# Patient Record
Sex: FEMALE | Race: OTHER | Employment: UNEMPLOYED | URBAN - METROPOLITAN AREA
[De-identification: names, ages, dates, MRNs, and addresses within clinical notes are randomized per-mention and may not be internally consistent; named-entity substitution may affect disease eponyms.]

---
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
## 2020-04-11 ENCOUNTER — APPOINTMENT (OUTPATIENT)
Dept: CT IMAGING | Age: 20
End: 2020-04-11
Payer: COMMERCIAL

## 2020-04-11 ENCOUNTER — HOSPITAL ENCOUNTER (EMERGENCY)
Age: 20
Discharge: HOME | End: 2020-04-11
Payer: COMMERCIAL

## 2020-04-11 VITALS
RESPIRATION RATE: 18 BRPM | DIASTOLIC BLOOD PRESSURE: 67 MMHG | HEART RATE: 89 BPM | TEMPERATURE: 98 F | OXYGEN SATURATION: 100 % | SYSTOLIC BLOOD PRESSURE: 123 MMHG | HEIGHT: 64 IN | WEIGHT: 210 LBS | BODY MASS INDEX: 35.85 KG/M2

## 2020-04-11 LAB
ALBUMIN SERPL-MCNC: 4.6 GM/DL (ref 3.4–5)
ALP BLD-CCNC: 43 IU/L (ref 40–129)
ALT SERPL-CCNC: 12 U/L (ref 10–40)
ANION GAP SERPL CALCULATED.3IONS-SCNC: 15 MMOL/L (ref 4–16)
AST SERPL-CCNC: 16 IU/L (ref 15–37)
BASOPHILS ABSOLUTE: 0 K/CU MM
BASOPHILS RELATIVE PERCENT: 0.2 % (ref 0–1)
BILIRUB SERPL-MCNC: 0.4 MG/DL (ref 0–1)
BUN BLDV-MCNC: 9 MG/DL (ref 6–23)
CALCIUM SERPL-MCNC: 9.5 MG/DL (ref 8.3–10.6)
CHLORIDE BLD-SCNC: 106 MMOL/L (ref 99–110)
CO2: 16 MMOL/L (ref 21–32)
CREAT SERPL-MCNC: 0.7 MG/DL (ref 0.6–1.1)
DIFFERENTIAL TYPE: ABNORMAL
EOSINOPHILS ABSOLUTE: 0 K/CU MM
EOSINOPHILS RELATIVE PERCENT: 0.1 % (ref 0–3)
GFR AFRICAN AMERICAN: >60 ML/MIN/1.73M2
GFR NON-AFRICAN AMERICAN: >60 ML/MIN/1.73M2
GLUCOSE BLD-MCNC: 126 MG/DL (ref 70–99)
HCG QUALITATIVE: NEGATIVE
HCT VFR BLD CALC: 46.1 % (ref 37–47)
HEMOGLOBIN: 15.1 GM/DL (ref 12.5–16)
IMMATURE NEUTROPHIL %: 0.3 % (ref 0–0.43)
LIPASE: 17 IU/L (ref 13–60)
LYMPHOCYTES ABSOLUTE: 1.1 K/CU MM
LYMPHOCYTES RELATIVE PERCENT: 7.9 % (ref 24–44)
MCH RBC QN AUTO: 29.5 PG (ref 27–31)
MCHC RBC AUTO-ENTMCNC: 32.8 % (ref 32–36)
MCV RBC AUTO: 90.2 FL (ref 78–100)
MONOCYTES ABSOLUTE: 0.5 K/CU MM
MONOCYTES RELATIVE PERCENT: 3.9 % (ref 0–4)
NUCLEATED RBC %: 0 %
PDW BLD-RTO: 12.7 % (ref 11.7–14.9)
PLATELET # BLD: 292 K/CU MM (ref 140–440)
PMV BLD AUTO: 10.1 FL (ref 7.5–11.1)
POTASSIUM SERPL-SCNC: 3.8 MMOL/L (ref 3.5–5.1)
RBC # BLD: 5.11 M/CU MM (ref 4.2–5.4)
SEGMENTED NEUTROPHILS ABSOLUTE COUNT: 11.6 K/CU MM
SEGMENTED NEUTROPHILS RELATIVE PERCENT: 87.6 % (ref 36–66)
SODIUM BLD-SCNC: 137 MMOL/L (ref 135–145)
TOTAL IMMATURE NEUTOROPHIL: 0.04 K/CU MM
TOTAL NUCLEATED RBC: 0 K/CU MM
TOTAL PROTEIN: 7.7 GM/DL (ref 6.4–8.2)
WBC # BLD: 13.3 K/CU MM (ref 4–10.5)

## 2020-04-11 PROCEDURE — 96372 THER/PROPH/DIAG INJ SC/IM: CPT

## 2020-04-11 PROCEDURE — 84703 CHORIONIC GONADOTROPIN ASSAY: CPT

## 2020-04-11 PROCEDURE — 80053 COMPREHEN METABOLIC PANEL: CPT

## 2020-04-11 PROCEDURE — 83690 ASSAY OF LIPASE: CPT

## 2020-04-11 PROCEDURE — 96376 TX/PRO/DX INJ SAME DRUG ADON: CPT

## 2020-04-11 PROCEDURE — 96375 TX/PRO/DX INJ NEW DRUG ADDON: CPT

## 2020-04-11 PROCEDURE — 74177 CT ABD & PELVIS W/CONTRAST: CPT

## 2020-04-11 PROCEDURE — 93005 ELECTROCARDIOGRAM TRACING: CPT | Performed by: PHYSICIAN ASSISTANT

## 2020-04-11 PROCEDURE — 6360000002 HC RX W HCPCS: Performed by: PHYSICIAN ASSISTANT

## 2020-04-11 PROCEDURE — 85025 COMPLETE CBC W/AUTO DIFF WBC: CPT

## 2020-04-11 PROCEDURE — 96374 THER/PROPH/DIAG INJ IV PUSH: CPT

## 2020-04-11 PROCEDURE — 96361 HYDRATE IV INFUSION ADD-ON: CPT

## 2020-04-11 PROCEDURE — 99284 EMERGENCY DEPT VISIT MOD MDM: CPT

## 2020-04-11 PROCEDURE — 2580000003 HC RX 258: Performed by: PHYSICIAN ASSISTANT

## 2020-04-11 PROCEDURE — 6360000004 HC RX CONTRAST MEDICATION: Performed by: PHYSICIAN ASSISTANT

## 2020-04-11 RX ORDER — HALOPERIDOL 5 MG/ML
1 INJECTION INTRAMUSCULAR ONCE
Status: DISCONTINUED | OUTPATIENT
Start: 2020-04-11 | End: 2020-04-11

## 2020-04-11 RX ORDER — HALOPERIDOL 5 MG/ML
5 INJECTION INTRAMUSCULAR ONCE
Status: DISCONTINUED | OUTPATIENT
Start: 2020-04-11 | End: 2020-04-11

## 2020-04-11 RX ORDER — DICYCLOMINE HYDROCHLORIDE 10 MG/1
20 CAPSULE ORAL
Qty: 30 CAPSULE | Refills: 0 | Status: SHIPPED | OUTPATIENT
Start: 2020-04-11 | End: 2020-05-18

## 2020-04-11 RX ORDER — PROMETHAZINE HYDROCHLORIDE 25 MG/ML
25 INJECTION, SOLUTION INTRAMUSCULAR; INTRAVENOUS ONCE
Status: COMPLETED | OUTPATIENT
Start: 2020-04-11 | End: 2020-04-11

## 2020-04-11 RX ORDER — ONDANSETRON 2 MG/ML
4 INJECTION INTRAMUSCULAR; INTRAVENOUS EVERY 30 MIN PRN
Status: DISCONTINUED | OUTPATIENT
Start: 2020-04-11 | End: 2020-04-11 | Stop reason: HOSPADM

## 2020-04-11 RX ORDER — ONDANSETRON 4 MG/1
4 TABLET, ORALLY DISINTEGRATING ORAL EVERY 6 HOURS
Qty: 10 TABLET | Refills: 0 | Status: SHIPPED | OUTPATIENT
Start: 2020-04-11 | End: 2020-05-18

## 2020-04-11 RX ORDER — 0.9 % SODIUM CHLORIDE 0.9 %
1000 INTRAVENOUS SOLUTION INTRAVENOUS ONCE
Status: COMPLETED | OUTPATIENT
Start: 2020-04-11 | End: 2020-04-11

## 2020-04-11 RX ORDER — PROCHLORPERAZINE EDISYLATE 5 MG/ML
10 INJECTION INTRAMUSCULAR; INTRAVENOUS ONCE
Status: COMPLETED | OUTPATIENT
Start: 2020-04-11 | End: 2020-04-11

## 2020-04-11 RX ORDER — PROMETHAZINE HYDROCHLORIDE 25 MG/1
25 TABLET ORAL EVERY 6 HOURS PRN
Qty: 12 TABLET | Refills: 0 | Status: SHIPPED | OUTPATIENT
Start: 2020-04-11 | End: 2020-04-16

## 2020-04-11 RX ADMIN — ONDANSETRON 4 MG: 2 INJECTION INTRAMUSCULAR; INTRAVENOUS at 17:25

## 2020-04-11 RX ADMIN — PROCHLORPERAZINE EDISYLATE 10 MG: 5 INJECTION INTRAMUSCULAR; INTRAVENOUS at 18:44

## 2020-04-11 RX ADMIN — ONDANSETRON 4 MG: 2 INJECTION INTRAMUSCULAR; INTRAVENOUS at 18:39

## 2020-04-11 RX ADMIN — PROMETHAZINE HYDROCHLORIDE 25 MG: 25 INJECTION INTRAMUSCULAR; INTRAVENOUS at 16:36

## 2020-04-11 RX ADMIN — SODIUM CHLORIDE 1000 ML: 9 INJECTION, SOLUTION INTRAVENOUS at 18:39

## 2020-04-11 RX ADMIN — IOPAMIDOL 75 ML: 755 INJECTION, SOLUTION INTRAVENOUS at 19:26

## 2020-04-11 ASSESSMENT — PAIN SCALES - GENERAL
PAINLEVEL_OUTOF10: 8
PAINLEVEL_OUTOF10: 0

## 2020-04-11 ASSESSMENT — PAIN DESCRIPTION - LOCATION: LOCATION: ABDOMEN

## 2020-04-11 ASSESSMENT — PAIN DESCRIPTION - PAIN TYPE: TYPE: ACUTE PAIN

## 2020-04-11 NOTE — ED PROVIDER NOTES
9/21/19   Cyndee Zhang PA-C   ondansetron (ZOFRAN) 4 MG tablet Take 1 tablet by mouth every 8 hours as needed for Nausea 9/21/19   Cyndee Zhang PA-C   metoclopramide (REGLAN) 10 MG tablet Take 1 tablet by mouth 4 times daily for 7 days 9/1/18 9/8/18  ZAID Roberson CNP   ranitidine (ZANTAC) 300 MG tablet Take 1 tablet by mouth nightly 9/1/18   ZAID Roberson CNP   ondansetron (ZOFRAN ODT) 4 MG disintegrating tablet Take 1 tablet by mouth every 6 hours 4/6/18   Harman Dietz PA-C   hydrOXYzine (VISTARIL) 25 MG capsule Take 1 capsule by mouth three times daily 5/10/17   Kristi Goel PA-C   acetaminophen (APAP EXTRA STRENGTH) 500 MG tablet Take 1 tablet by mouth every 6 hours as needed for Pain 5/8/17   Kristi Goel PA-C   traZODone (DESYREL) 50 MG tablet Take 50 mg by mouth nightly    Historical Provider, MD   lurasidone (LATUDA) 20 MG TABS tablet Take 20 mg by mouth daily    Historical Provider, MD   divalproex (DEPAKOTE) 500 MG DR tablet Take 500 mg by mouth nightly. Historical Provider, MD   ARIPiprazole (ABILIFY) 5 MG tablet Take 10 mg by mouth daily. Historical Provider, MD   ibuprofen (IBU) 400 MG tablet Take 1 tablet by mouth every 6 hours as needed for Pain. 2/25/12 2/24/13  Manoj Reynoso MD       Social history:  reports that she has been smoking cigarettes. She has been smoking about 0.50 packs per day. She has never used smokeless tobacco. She reports current alcohol use. She reports current drug use. Drug: Marijuana. Family history:    Family History   Problem Relation Age of Onset    Asthma Mother          Exam  BP (!) 134/90   Pulse 70   Temp 97.7 °F (36.5 °C)   Resp 18   Ht 5' 4\" (1.626 m)   Wt 210 lb (95.3 kg)   SpO2 100%   BMI 36.05 kg/m²   Nursing note and vitals reviewed. Constitutional: Well developed, well nourished. Repeatedly actively retching, minimal vomitus produced    HENT:      Head: Normocephalic and atraumatic. Ears: External ears normal.      Nose: Nose normal.     Mouth: Membrane mucosa moist and pink. No posterior oropharynx erythema or tonsillar edema  Eyes: Anicteric sclera. No discharge, PERRL  Neck: Supple. Trachea midline. Cardiovascular: RRR, no murmurs, rubs, or gallops, radial pulses 2+ bilaterally. Pulmonary/Chest: Effort normal. No respiratory distress. CTAB. No stridor. No wheezes. No rales. Abdominal: Soft. TTP in epigastric region. No distension. No guarding, rebound tenderness, or evidence of ascites. : No CVA tenderness. Musculoskeletal: Moves all extremities. No gross deformity. Neurological: Alert and oriented to person, place, and time. Normal muscle tone. Skin: Warm and dry. No rash. Psychiatric: Normal mood and affect. Behavior is normal.      EKG   Please see Dr. Mark Arrieta note for EKG read.       Radiographs (if obtained):  [] The following radiograph was interpreted by myself in the absence of a radiologist:   [x] Radiologist's Report Reviewed:  CT ABDOMEN PELVIS W IV CONTRAST    (Results Pending)          Labs  Results for orders placed or performed during the hospital encounter of 04/11/20   CBC Auto Differential   Result Value Ref Range    WBC 13.3 (H) 4.0 - 10.5 K/CU MM    RBC 5.11 4.2 - 5.4 M/CU MM    Hemoglobin 15.1 12.5 - 16.0 GM/DL    Hematocrit 46.1 37 - 47 %    MCV 90.2 78 - 100 FL    MCH 29.5 27 - 31 PG    MCHC 32.8 32.0 - 36.0 %    RDW 12.7 11.7 - 14.9 %    Platelets 632 940 - 747 K/CU MM    MPV 10.1 7.5 - 11.1 FL    Differential Type AUTOMATED DIFFERENTIAL     Segs Relative 87.6 (H) 36 - 66 %    Lymphocytes % 7.9 (L) 24 - 44 %    Monocytes % 3.9 0 - 4 %    Eosinophils % 0.1 0 - 3 %    Basophils % 0.2 0 - 1 %    Segs Absolute 11.6 K/CU MM    Lymphocytes Absolute 1.1 K/CU MM    Monocytes Absolute 0.5 K/CU MM    Eosinophils Absolute 0.0 K/CU MM    Basophils Absolute 0.0 K/CU MM    Nucleated RBC % 0.0 %    Total Nucleated RBC 0.0 K/CU MM    Total Immature Neutrophil

## 2020-04-11 NOTE — ED TRIAGE NOTES
Pt states she woke up at noon with diarrhea and vomiting. Pt tried to take immodium and pepto bismal with out improvement. Pt also complains of epigastric/upper abd pain.

## 2020-04-12 PROCEDURE — 93010 ELECTROCARDIOGRAM REPORT: CPT | Performed by: INTERNAL MEDICINE

## 2020-04-13 ENCOUNTER — HOSPITAL ENCOUNTER (EMERGENCY)
Age: 20
Discharge: HOME | End: 2020-04-13
Attending: EMERGENCY MEDICINE
Payer: COMMERCIAL

## 2020-04-13 ENCOUNTER — CARE COORDINATION (OUTPATIENT)
Dept: CARE COORDINATION | Age: 20
End: 2020-04-13

## 2020-04-13 VITALS
WEIGHT: 210 LBS | BODY MASS INDEX: 35.85 KG/M2 | TEMPERATURE: 98 F | HEIGHT: 64 IN | DIASTOLIC BLOOD PRESSURE: 87 MMHG | HEART RATE: 80 BPM | OXYGEN SATURATION: 100 % | RESPIRATION RATE: 17 BRPM | SYSTOLIC BLOOD PRESSURE: 122 MMHG

## 2020-04-13 LAB
ALBUMIN SERPL-MCNC: 4.5 GM/DL (ref 3.4–5)
ALP BLD-CCNC: 41 IU/L (ref 40–129)
ALT SERPL-CCNC: 15 U/L (ref 10–40)
ANION GAP SERPL CALCULATED.3IONS-SCNC: 18 MMOL/L (ref 4–16)
AST SERPL-CCNC: 16 IU/L (ref 15–37)
BASOPHILS ABSOLUTE: 0.1 K/CU MM
BASOPHILS RELATIVE PERCENT: 0.3 % (ref 0–1)
BILIRUB SERPL-MCNC: 0.7 MG/DL (ref 0–1)
BUN BLDV-MCNC: 13 MG/DL (ref 6–23)
CALCIUM SERPL-MCNC: 9.2 MG/DL (ref 8.3–10.6)
CHLORIDE BLD-SCNC: 101 MMOL/L (ref 99–110)
CO2: 18 MMOL/L (ref 21–32)
CREAT SERPL-MCNC: 0.7 MG/DL (ref 0.6–1.1)
DIFFERENTIAL TYPE: ABNORMAL
EOSINOPHILS ABSOLUTE: 0.1 K/CU MM
EOSINOPHILS RELATIVE PERCENT: 0.4 % (ref 0–3)
GFR AFRICAN AMERICAN: >60 ML/MIN/1.73M2
GFR NON-AFRICAN AMERICAN: >60 ML/MIN/1.73M2
GLUCOSE BLD-MCNC: 111 MG/DL (ref 70–99)
HCG QUALITATIVE: NEGATIVE
HCT VFR BLD CALC: 44.8 % (ref 37–47)
HEMOGLOBIN: 15.6 GM/DL (ref 12.5–16)
IMMATURE NEUTROPHIL %: 0.4 % (ref 0–0.43)
LIPASE: 20 IU/L (ref 13–60)
LYMPHOCYTES ABSOLUTE: 2.1 K/CU MM
LYMPHOCYTES RELATIVE PERCENT: 11.6 % (ref 24–44)
MCH RBC QN AUTO: 29.7 PG (ref 27–31)
MCHC RBC AUTO-ENTMCNC: 34.8 % (ref 32–36)
MCV RBC AUTO: 85.2 FL (ref 78–100)
MONOCYTES ABSOLUTE: 1.1 K/CU MM
MONOCYTES RELATIVE PERCENT: 6 % (ref 0–4)
NUCLEATED RBC %: 0 %
PDW BLD-RTO: 12.6 % (ref 11.7–14.9)
PLATELET # BLD: 378 K/CU MM (ref 140–440)
PMV BLD AUTO: 9.9 FL (ref 7.5–11.1)
POTASSIUM SERPL-SCNC: 3.3 MMOL/L (ref 3.5–5.1)
RBC # BLD: 5.26 M/CU MM (ref 4.2–5.4)
SEGMENTED NEUTROPHILS ABSOLUTE COUNT: 14.7 K/CU MM
SEGMENTED NEUTROPHILS RELATIVE PERCENT: 81.3 % (ref 36–66)
SODIUM BLD-SCNC: 137 MMOL/L (ref 135–145)
TOTAL IMMATURE NEUTOROPHIL: 0.07 K/CU MM
TOTAL NUCLEATED RBC: 0 K/CU MM
TOTAL PROTEIN: 7.7 GM/DL (ref 6.4–8.2)
WBC # BLD: 18 K/CU MM (ref 4–10.5)

## 2020-04-13 PROCEDURE — 83690 ASSAY OF LIPASE: CPT

## 2020-04-13 PROCEDURE — 99284 EMERGENCY DEPT VISIT MOD MDM: CPT

## 2020-04-13 PROCEDURE — 6360000002 HC RX W HCPCS: Performed by: EMERGENCY MEDICINE

## 2020-04-13 PROCEDURE — 36415 COLL VENOUS BLD VENIPUNCTURE: CPT

## 2020-04-13 PROCEDURE — 80053 COMPREHEN METABOLIC PANEL: CPT

## 2020-04-13 PROCEDURE — 96375 TX/PRO/DX INJ NEW DRUG ADDON: CPT

## 2020-04-13 PROCEDURE — 84703 CHORIONIC GONADOTROPIN ASSAY: CPT

## 2020-04-13 PROCEDURE — 96374 THER/PROPH/DIAG INJ IV PUSH: CPT

## 2020-04-13 PROCEDURE — 2580000003 HC RX 258: Performed by: EMERGENCY MEDICINE

## 2020-04-13 PROCEDURE — 85025 COMPLETE CBC W/AUTO DIFF WBC: CPT

## 2020-04-13 RX ORDER — HALOPERIDOL 5 MG/ML
4 INJECTION INTRAMUSCULAR ONCE
Status: COMPLETED | OUTPATIENT
Start: 2020-04-13 | End: 2020-04-13

## 2020-04-13 RX ORDER — ONDANSETRON 2 MG/ML
4 INJECTION INTRAMUSCULAR; INTRAVENOUS ONCE
Status: COMPLETED | OUTPATIENT
Start: 2020-04-13 | End: 2020-04-13

## 2020-04-13 RX ORDER — DIPHENHYDRAMINE HYDROCHLORIDE 50 MG/ML
25 INJECTION INTRAMUSCULAR; INTRAVENOUS ONCE
Status: COMPLETED | OUTPATIENT
Start: 2020-04-13 | End: 2020-04-13

## 2020-04-13 RX ORDER — 0.9 % SODIUM CHLORIDE 0.9 %
1000 INTRAVENOUS SOLUTION INTRAVENOUS ONCE
Status: COMPLETED | OUTPATIENT
Start: 2020-04-13 | End: 2020-04-13

## 2020-04-13 RX ADMIN — ONDANSETRON 4 MG: 2 INJECTION INTRAMUSCULAR; INTRAVENOUS at 22:02

## 2020-04-13 RX ADMIN — DIPHENHYDRAMINE HYDROCHLORIDE 25 MG: 50 INJECTION INTRAMUSCULAR; INTRAVENOUS at 21:04

## 2020-04-13 RX ADMIN — HALOPERIDOL LACTATE 4 MG: 5 INJECTION, SOLUTION INTRAMUSCULAR at 21:05

## 2020-04-13 RX ADMIN — SODIUM CHLORIDE 1000 ML: 9 INJECTION, SOLUTION INTRAVENOUS at 20:28

## 2020-04-13 ASSESSMENT — PAIN SCALES - GENERAL
PAINLEVEL_OUTOF10: 0
PAINLEVEL_OUTOF10: 9

## 2020-04-13 ASSESSMENT — PAIN DESCRIPTION - PAIN TYPE: TYPE: ACUTE PAIN

## 2020-04-13 ASSESSMENT — PAIN DESCRIPTION - LOCATION: LOCATION: ABDOMEN

## 2020-04-13 NOTE — CARE COORDINATION
Patient contacted regarding recent discharge and COVID-19 risk   Care Transition Nurse/ Ambulatory Care Manager contacted the patient by telephone to perform post discharge assessment. Line busy x 2. User busy.

## 2020-04-14 ENCOUNTER — CARE COORDINATION (OUTPATIENT)
Dept: CARE COORDINATION | Age: 20
End: 2020-04-14

## 2020-04-14 NOTE — ED PROVIDER NOTES
[address] [facility]  EMERGENCYDEPARTMENT ENCOUNTER      Pt Name: Jillian Lal  MRN: [id number]  Armstrongfsameer 2000of evaluation: 4/13/2020  Provider:Alvarado Cruz MD    [address] [place] [address]       Chief Complaint   Patient presents with    Abdominal Pain     mid-upper; states that it started 2 days ago and has progressed. Pt states that she was seen 2 days ago for same symptoms    Nausea    Emesis       HISTORY OF PRESENT ILLNESS    Jillian Lal is a 21 y.o. female who presents to the emergency department with abdominal pain. Started 2 days ago, just after smoking marijuana. Patient has history of cannabis hyperemesis syndrome. She says the pain is 9 or 10 severity, she cannot stop vomiting. She is taking Zofran but vomited it up. Nursing Notes were reviewed. REVIEW OF SYSTEMS       Review of Systems    10 point review of systems was performed and was negative exceptas specifically noted in the HPI. PAST MEDICAL HISTORY     Past Medical History:   Diagnosis Date    ADHD (attention deficit hyperactivity disorder)     Anxiety     Bipolar 1 disorder ([facility] [address]     Depression          SURGICAL HISTORY     History reviewed. No pertinent surgical history.       CURRENT MEDICATIONS       Discharge Medication List as of 4/13/2020 11:00 PM      CONTINUE these medications which have NOT CHANGED    Details   dicyclomine (BENTYL) 10 MG capsule Take 2 capsules by mouth 4 times daily (before meals and nightly), Disp-30 capsule, R-0Print      ondansetron (ZOFRAN ODT) 4 MG disintegrating tablet Take 1 tablet by mouth every 6 hours, Disp-10 tablet, R-0Print      promethazine (PHENERGAN) 25 MG tablet Take 1 tablet by mouth every 6 hours as needed for Nausea, Disp-12 tablet, R-0Print      famotidine (PEPCID) 20 MG tablet Take 1 tablet by mouth 2 times daily, Disp-30 tablet, R-0Print      ondansetron (ZOFRAN) 4 MG tablet Take 1 tablet by mouth every 8 hours as needed for Nausea, Disp-10 tablet, R-0Print      metoclopramide (REGLAN) 10 MG tablet Take 1 tablet by mouth 4 times daily for 7 days, Disp-20 tablet, R-0Print      ranitidine (ZANTAC) 300 MG tablet Take 1 tablet by mouth nightly, Disp-30 tablet, R-0Print      hydrOXYzine (VISTARIL) 25 MG capsule Take 1 capsule by mouth three times daily, Disp-30 capsule, R-1Print      acetaminophen (APAP EXTRA STRENGTH) 500 MG tablet Take 1 tablet by mouth every 6 hours as needed for Pain, Disp-30 tablet, R-0Print      traZODone (DESYREL) 50 MG tablet Take 50 mg by mouth nightly      lurasidone (LATUDA) 20 MG TABS tablet Take 20 mg by mouth daily      divalproex (DEPAKOTE) 500 MG DR tablet Take 500 mg by mouth nightly. ARIPiprazole (ABILIFY) 5 MG tablet Take 10 mg by mouth daily. ibuprofen (IBU) 400 MG tablet Take 1 tablet by mouth every 6 hours as needed for Pain., Disp-15 tablet, R-0             ALLERGIES     Patient has no known allergies.     FAMILY HISTORY       Family History   Problem Relation Age of Onset    Asthma Mother           SOCIAL HISTORY       Social History     Socioeconomic History    Marital status: Single     Spouse name: None    Number of children: None    Years of education: None    Highest education level: None   Occupational History    None   Social Needs    Financial resource strain: None    Food insecurity     Worry: None     Inability: None    Transportation needs     Medical: None     Non-medical: None   Tobacco Use    Smoking status: Current Every Day Smoker     Packs/day: 0.50     Types: Cigarettes    Smokeless tobacco: Never Used   Substance and Sexual Activity    Alcohol use: Yes     Comment: rarely    Drug use: Yes     Types: Marijuana    Sexual activity: Never   Lifestyle    Physical activity     Days per week: None     Minutes per session: None    Stress: None   Relationships    Social connections     Talks on phone: None     Gets together: None     Attends

## 2020-04-14 NOTE — ED NOTES
PO challenge completed at this time. Patient able to keep fluids down.       Jonathan Torres RN  04/13/20 [id number]

## 2020-04-15 ENCOUNTER — CARE COORDINATION (OUTPATIENT)
Dept: CARE COORDINATION | Age: 20
End: 2020-04-15

## 2020-04-15 NOTE — CARE COORDINATION
Attempted call to pt regarding recent discharge and COVID-19 risk   Care Transition Nurse/ Ambulatory Care Manager contacted the patient by telephone to perform post discharge assessment. Line busy/user busy.

## 2020-04-16 ENCOUNTER — HOSPITAL ENCOUNTER (EMERGENCY)
Age: 20
Discharge: HOME | End: 2020-04-16
Attending: EMERGENCY MEDICINE
Payer: COMMERCIAL

## 2020-04-16 VITALS
WEIGHT: 210 LBS | OXYGEN SATURATION: 100 % | DIASTOLIC BLOOD PRESSURE: 72 MMHG | BODY MASS INDEX: 35.85 KG/M2 | SYSTOLIC BLOOD PRESSURE: 158 MMHG | RESPIRATION RATE: 17 BRPM | HEIGHT: 64 IN | TEMPERATURE: 98.7 F | HEART RATE: 75 BPM

## 2020-04-16 LAB
ALBUMIN SERPL-MCNC: 4.6 GM/DL (ref 3.4–5)
ALP BLD-CCNC: 40 IU/L (ref 40–129)
ALT SERPL-CCNC: 16 U/L (ref 10–40)
ANION GAP SERPL CALCULATED.3IONS-SCNC: 16 MMOL/L (ref 4–16)
AST SERPL-CCNC: 14 IU/L (ref 15–37)
BASOPHILS ABSOLUTE: 0 K/CU MM
BASOPHILS RELATIVE PERCENT: 0.2 % (ref 0–1)
BILIRUB SERPL-MCNC: 1 MG/DL (ref 0–1)
BUN BLDV-MCNC: 9 MG/DL (ref 6–23)
CALCIUM SERPL-MCNC: 9.4 MG/DL (ref 8.3–10.6)
CHLORIDE BLD-SCNC: 99 MMOL/L (ref 99–110)
CO2: 21 MMOL/L (ref 21–32)
CREAT SERPL-MCNC: 0.7 MG/DL (ref 0.6–1.1)
DIFFERENTIAL TYPE: ABNORMAL
EKG ATRIAL RATE: 73 BPM
EKG DIAGNOSIS: NORMAL
EKG P AXIS: 29 DEGREES
EKG P-R INTERVAL: 176 MS
EKG Q-T INTERVAL: 430 MS
EKG QRS DURATION: 94 MS
EKG QTC CALCULATION (BAZETT): 473 MS
EKG R AXIS: 29 DEGREES
EKG T AXIS: 1 DEGREES
EKG VENTRICULAR RATE: 73 BPM
EOSINOPHILS ABSOLUTE: 0 K/CU MM
EOSINOPHILS RELATIVE PERCENT: 0.1 % (ref 0–3)
GFR AFRICAN AMERICAN: >60 ML/MIN/1.73M2
GFR NON-AFRICAN AMERICAN: >60 ML/MIN/1.73M2
GLUCOSE BLD-MCNC: 109 MG/DL (ref 70–99)
GONADOTROPIN, CHORIONIC (HCG) QUANT: NORMAL UIU/ML
HCT VFR BLD CALC: 42.9 % (ref 37–47)
HEMOGLOBIN: 14.9 GM/DL (ref 12.5–16)
IMMATURE NEUTROPHIL %: 0.4 % (ref 0–0.43)
LYMPHOCYTES ABSOLUTE: 2.2 K/CU MM
LYMPHOCYTES RELATIVE PERCENT: 17.7 % (ref 24–44)
MCH RBC QN AUTO: 29.2 PG (ref 27–31)
MCHC RBC AUTO-ENTMCNC: 34.7 % (ref 32–36)
MCV RBC AUTO: 84 FL (ref 78–100)
MONOCYTES ABSOLUTE: 1 K/CU MM
MONOCYTES RELATIVE PERCENT: 8.3 % (ref 0–4)
NUCLEATED RBC %: 0 %
PDW BLD-RTO: 12.3 % (ref 11.7–14.9)
PLATELET # BLD: 317 K/CU MM (ref 140–440)
PMV BLD AUTO: 9.8 FL (ref 7.5–11.1)
POTASSIUM SERPL-SCNC: 3.4 MMOL/L (ref 3.5–5.1)
RBC # BLD: 5.11 M/CU MM (ref 4.2–5.4)
SEGMENTED NEUTROPHILS ABSOLUTE COUNT: 9.1 K/CU MM
SEGMENTED NEUTROPHILS RELATIVE PERCENT: 73.3 % (ref 36–66)
SODIUM BLD-SCNC: 136 MMOL/L (ref 135–145)
TOTAL IMMATURE NEUTOROPHIL: 0.05 K/CU MM
TOTAL NUCLEATED RBC: 0 K/CU MM
TOTAL PROTEIN: 7.6 GM/DL (ref 6.4–8.2)
WBC # BLD: 12.5 K/CU MM (ref 4–10.5)

## 2020-04-16 PROCEDURE — 85025 COMPLETE CBC W/AUTO DIFF WBC: CPT

## 2020-04-16 PROCEDURE — 99284 EMERGENCY DEPT VISIT MOD MDM: CPT

## 2020-04-16 PROCEDURE — 6370000000 HC RX 637 (ALT 250 FOR IP): Performed by: EMERGENCY MEDICINE

## 2020-04-16 PROCEDURE — 96372 THER/PROPH/DIAG INJ SC/IM: CPT

## 2020-04-16 PROCEDURE — 6360000002 HC RX W HCPCS: Performed by: EMERGENCY MEDICINE

## 2020-04-16 PROCEDURE — 80053 COMPREHEN METABOLIC PANEL: CPT

## 2020-04-16 PROCEDURE — 84702 CHORIONIC GONADOTROPIN TEST: CPT

## 2020-04-16 RX ORDER — DIPHENHYDRAMINE HYDROCHLORIDE 50 MG/ML
INJECTION INTRAMUSCULAR; INTRAVENOUS
Status: DISCONTINUED
Start: 2020-04-16 | End: 2020-04-16 | Stop reason: HOSPADM

## 2020-04-16 RX ORDER — ONDANSETRON 4 MG/1
8 TABLET, ORALLY DISINTEGRATING ORAL ONCE
Status: COMPLETED | OUTPATIENT
Start: 2020-04-16 | End: 2020-04-16

## 2020-04-16 RX ORDER — PROMETHAZINE HYDROCHLORIDE 25 MG/1
25 SUPPOSITORY RECTAL EVERY 6 HOURS PRN
Qty: 10 SUPPOSITORY | Refills: 0 | Status: SHIPPED | OUTPATIENT
Start: 2020-04-16 | End: 2022-02-17

## 2020-04-16 RX ORDER — DIPHENHYDRAMINE HYDROCHLORIDE 50 MG/ML
25 INJECTION INTRAMUSCULAR; INTRAVENOUS ONCE
Status: COMPLETED | OUTPATIENT
Start: 2020-04-16 | End: 2020-04-16

## 2020-04-16 RX ORDER — HALOPERIDOL 5 MG/ML
5 INJECTION INTRAMUSCULAR ONCE
Status: COMPLETED | OUTPATIENT
Start: 2020-04-16 | End: 2020-04-16

## 2020-04-16 RX ADMIN — DIPHENHYDRAMINE HYDROCHLORIDE 25 MG: 50 INJECTION INTRAMUSCULAR; INTRAVENOUS at 01:37

## 2020-04-16 RX ADMIN — HALOPERIDOL LACTATE 5 MG: 5 INJECTION, SOLUTION INTRAMUSCULAR at 01:37

## 2020-04-16 RX ADMIN — ONDANSETRON 8 MG: 4 TABLET, ORALLY DISINTEGRATING ORAL at 01:37

## 2020-04-16 ASSESSMENT — PAIN DESCRIPTION - FREQUENCY: FREQUENCY: CONTINUOUS

## 2020-04-16 ASSESSMENT — PAIN DESCRIPTION - DESCRIPTORS: DESCRIPTORS: ACHING

## 2020-04-16 ASSESSMENT — PAIN DESCRIPTION - ORIENTATION: ORIENTATION: MID

## 2020-04-16 ASSESSMENT — PAIN DESCRIPTION - PAIN TYPE: TYPE: ACUTE PAIN

## 2020-04-16 ASSESSMENT — PAIN DESCRIPTION - LOCATION: LOCATION: ABDOMEN

## 2020-04-16 ASSESSMENT — PAIN SCALES - GENERAL: PAINLEVEL_OUTOF10: 10

## 2020-04-16 ASSESSMENT — PAIN DESCRIPTION - ONSET: ONSET: ON-GOING

## 2020-04-16 ASSESSMENT — PAIN DESCRIPTION - PROGRESSION: CLINICAL_PROGRESSION: NOT CHANGED

## 2020-04-16 NOTE — ED PROVIDER NOTES
three times daily 30 capsule 1    acetaminophen (APAP EXTRA STRENGTH) 500 MG tablet Take 1 tablet by mouth every 6 hours as needed for Pain 30 tablet 0    traZODone (DESYREL) 50 MG tablet Take 50 mg by mouth nightly      lurasidone (LATUDA) 20 MG TABS tablet Take 20 mg by mouth daily      divalproex (DEPAKOTE) 500 MG DR tablet Take 500 mg by mouth nightly.  ARIPiprazole (ABILIFY) 5 MG tablet Take 10 mg by mouth daily.  ibuprofen (IBU) 400 MG tablet Take 1 tablet by mouth every 6 hours as needed for Pain. 15 tablet 0     No Known Allergies    Nursing Notes Reviewed    Physical Exam:  ED Triage Vitals   Enc Vitals Group      BP 04/16/20 0054 (!) 158/72      Pulse 04/16/20 0054 63      Resp 04/16/20 0054 17      Temp 04/16/20 0054 98.7 °F (37.1 °C)      Temp Source 04/16/20 0054 Oral      SpO2 04/16/20 0054 100 %      Weight 04/16/20 0043 210 lb (95.3 kg)      Height 04/16/20 0043 5' 4\" (1.626 m)      Head Circumference --       Peak Flow --       Pain Score --       Pain Loc --       Pain Edu? --       Excl. in [address]? --      GENERAL APPEARANCE: Awake and alert. Cooperative. No acute distress. HEAD: Normocephalic. Atraumatic. EYES: EOM's grossly intact. Sclera anicteric. ENT: Mucous membranes are moist. Tolerates saliva. No trismus. NECK: No meningismus. HEART: RRR. Extremities pink  LUNGS: Respirations unlabored. Even chest rise bilaterally  ABDOMEN: Non distended. Nontender. No rebound or guarding  EXTREMITIES: No acute deformities. SKIN: Dry  NEUROLOGICAL: No gross facial drooping. Moves all 4 extremities spontaneously. PSYCHIATRIC: Normal mood.     I have reviewed and interpreted all of the currently available lab results from this visit (if applicable):  Results for orders placed or performed during the hospital encounter of 04/16/20   CBC Auto Differential   Result Value Ref Range    WBC 12.5 (H) 4.0 - 10.5 K/CU MM    RBC 5.11 4.2 - 5.4 M/CU MM    Hemoglobin 14.9 12.5 - 16.0 GM/DL

## 2020-04-16 NOTE — ED NOTES
Pt states \"feel a whole lot better\". Per Dr Gloria Poole, Pt given crackers and water at this time. Will monitor for Sx.       Rachelle Suresh RN  04/16/20 [id number]

## 2020-05-18 ENCOUNTER — APPOINTMENT (OUTPATIENT)
Dept: ULTRASOUND IMAGING | Age: 20
End: 2020-05-18
Payer: COMMERCIAL

## 2020-05-18 ENCOUNTER — HOSPITAL ENCOUNTER (EMERGENCY)
Age: 20
Discharge: HOME | End: 2020-05-18
Payer: COMMERCIAL

## 2020-05-18 ENCOUNTER — HOSPITAL ENCOUNTER (EMERGENCY)
Age: 20
Discharge: HOME | End: 2020-05-19
Attending: EMERGENCY MEDICINE
Payer: COMMERCIAL

## 2020-05-18 VITALS
OXYGEN SATURATION: 98 % | TEMPERATURE: 98.4 F | SYSTOLIC BLOOD PRESSURE: 124 MMHG | HEIGHT: 64 IN | HEART RATE: 87 BPM | DIASTOLIC BLOOD PRESSURE: 74 MMHG | WEIGHT: 209 LBS | RESPIRATION RATE: 16 BRPM | BODY MASS INDEX: 35.68 KG/M2

## 2020-05-18 VITALS
TEMPERATURE: 99.6 F | SYSTOLIC BLOOD PRESSURE: 156 MMHG | HEIGHT: 64 IN | HEART RATE: 84 BPM | OXYGEN SATURATION: 99 % | WEIGHT: 209 LBS | DIASTOLIC BLOOD PRESSURE: 108 MMHG | BODY MASS INDEX: 35.68 KG/M2 | RESPIRATION RATE: 16 BRPM

## 2020-05-18 LAB
ALBUMIN SERPL-MCNC: 4.5 GM/DL (ref 3.4–5)
ALP BLD-CCNC: 41 IU/L (ref 40–129)
ALT SERPL-CCNC: 83 U/L (ref 10–40)
ANION GAP SERPL CALCULATED.3IONS-SCNC: 16 MMOL/L (ref 4–16)
AST SERPL-CCNC: 39 IU/L (ref 15–37)
BACTERIA: ABNORMAL /HPF
BASOPHILS ABSOLUTE: 0.1 K/CU MM
BASOPHILS RELATIVE PERCENT: 0.5 % (ref 0–1)
BILIRUB SERPL-MCNC: 1.8 MG/DL (ref 0–1)
BILIRUBIN URINE: NEGATIVE MG/DL
BLOOD, URINE: NEGATIVE
BUN BLDV-MCNC: 11 MG/DL (ref 6–23)
CALCIUM SERPL-MCNC: 9.5 MG/DL (ref 8.3–10.6)
CHLORIDE BLD-SCNC: 92 MMOL/L (ref 99–110)
CLARITY: ABNORMAL
CO2: 23 MMOL/L (ref 21–32)
COLOR: ABNORMAL
CREAT SERPL-MCNC: 0.8 MG/DL (ref 0.6–1.1)
DIFFERENTIAL TYPE: ABNORMAL
EOSINOPHILS ABSOLUTE: 0.1 K/CU MM
EOSINOPHILS RELATIVE PERCENT: 0.9 % (ref 0–3)
GFR AFRICAN AMERICAN: >60 ML/MIN/1.73M2
GFR NON-AFRICAN AMERICAN: >60 ML/MIN/1.73M2
GLUCOSE BLD-MCNC: 124 MG/DL (ref 70–99)
GLUCOSE, URINE: NEGATIVE MG/DL
GONADOTROPIN, CHORIONIC (HCG) QUANT: <0.5 UIU/ML
HCT VFR BLD CALC: 47.3 % (ref 37–47)
HEMOGLOBIN: 16.1 GM/DL (ref 12.5–16)
IMMATURE NEUTROPHIL %: 0.5 % (ref 0–0.43)
KETONES, URINE: ABNORMAL MG/DL
LEUKOCYTE ESTERASE, URINE: ABNORMAL
LIPASE: 20 IU/L (ref 13–60)
LYMPHOCYTES ABSOLUTE: 3.1 K/CU MM
LYMPHOCYTES RELATIVE PERCENT: 28.9 % (ref 24–44)
MCH RBC QN AUTO: 29.4 PG (ref 27–31)
MCHC RBC AUTO-ENTMCNC: 34 % (ref 32–36)
MCV RBC AUTO: 86.5 FL (ref 78–100)
MONOCYTES ABSOLUTE: 1.2 K/CU MM
MONOCYTES RELATIVE PERCENT: 11.4 % (ref 0–4)
MUCUS: ABNORMAL HPF
NITRITE URINE, QUANTITATIVE: NEGATIVE
NUCLEATED RBC %: 0 %
PDW BLD-RTO: 12.5 % (ref 11.7–14.9)
PH, URINE: 7 (ref 5–8)
PLATELET # BLD: 414 K/CU MM (ref 140–440)
PMV BLD AUTO: 9.6 FL (ref 7.5–11.1)
POTASSIUM SERPL-SCNC: 3 MMOL/L (ref 3.5–5.1)
PROTEIN UA: NEGATIVE MG/DL
RBC # BLD: 5.47 M/CU MM (ref 4.2–5.4)
RBC URINE: ABNORMAL /HPF (ref 0–6)
SEGMENTED NEUTROPHILS ABSOLUTE COUNT: 6.1 K/CU MM
SEGMENTED NEUTROPHILS RELATIVE PERCENT: 57.8 % (ref 36–66)
SODIUM BLD-SCNC: 131 MMOL/L (ref 135–145)
SPECIFIC GRAVITY UA: 1.02 (ref 1–1.03)
SQUAMOUS EPITHELIAL: 27 /HPF
TOTAL IMMATURE NEUTOROPHIL: 0.05 K/CU MM
TOTAL NUCLEATED RBC: 0 K/CU MM
TOTAL PROTEIN: 7.9 GM/DL (ref 6.4–8.2)
TRANSITIONAL EPITHELIAL: <1 /HPF
TRICHOMONAS: ABNORMAL /HPF
UROBILINOGEN, URINE: 1 MG/DL (ref 0.2–1)
WBC # BLD: 10.6 K/CU MM (ref 4–10.5)
WBC UA: 5 /HPF (ref 0–5)

## 2020-05-18 PROCEDURE — 96372 THER/PROPH/DIAG INJ SC/IM: CPT

## 2020-05-18 PROCEDURE — 36415 COLL VENOUS BLD VENIPUNCTURE: CPT

## 2020-05-18 PROCEDURE — 6360000002 HC RX W HCPCS: Performed by: PHYSICIAN ASSISTANT

## 2020-05-18 PROCEDURE — 96365 THER/PROPH/DIAG IV INF INIT: CPT

## 2020-05-18 PROCEDURE — 83690 ASSAY OF LIPASE: CPT

## 2020-05-18 PROCEDURE — 76705 ECHO EXAM OF ABDOMEN: CPT

## 2020-05-18 PROCEDURE — 85025 COMPLETE CBC W/AUTO DIFF WBC: CPT

## 2020-05-18 PROCEDURE — 80053 COMPREHEN METABOLIC PANEL: CPT

## 2020-05-18 PROCEDURE — 81001 URINALYSIS AUTO W/SCOPE: CPT

## 2020-05-18 PROCEDURE — 2580000003 HC RX 258: Performed by: PHYSICIAN ASSISTANT

## 2020-05-18 PROCEDURE — 84702 CHORIONIC GONADOTROPIN TEST: CPT

## 2020-05-18 PROCEDURE — 99284 EMERGENCY DEPT VISIT MOD MDM: CPT

## 2020-05-18 RX ORDER — ONDANSETRON 4 MG/1
4 TABLET, ORALLY DISINTEGRATING ORAL EVERY 6 HOURS
Qty: 10 TABLET | Refills: 0 | Status: SHIPPED | OUTPATIENT
Start: 2020-05-18

## 2020-05-18 RX ORDER — HALOPERIDOL 5 MG/ML
5 INJECTION INTRAMUSCULAR ONCE
Status: COMPLETED | OUTPATIENT
Start: 2020-05-18 | End: 2020-05-18

## 2020-05-18 RX ORDER — DICYCLOMINE HYDROCHLORIDE 10 MG/1
20 CAPSULE ORAL
Qty: 30 CAPSULE | Refills: 0 | Status: SHIPPED | OUTPATIENT
Start: 2020-05-18

## 2020-05-18 RX ORDER — POTASSIUM CHLORIDE 7.45 MG/ML
10 INJECTION INTRAVENOUS ONCE
Status: COMPLETED | OUTPATIENT
Start: 2020-05-18 | End: 2020-05-18

## 2020-05-18 RX ORDER — 0.9 % SODIUM CHLORIDE 0.9 %
1000 INTRAVENOUS SOLUTION INTRAVENOUS ONCE
Status: COMPLETED | OUTPATIENT
Start: 2020-05-18 | End: 2020-05-18

## 2020-05-18 RX ORDER — ONDANSETRON 2 MG/ML
4 INJECTION INTRAMUSCULAR; INTRAVENOUS ONCE
Status: DISCONTINUED | OUTPATIENT
Start: 2020-05-18 | End: 2020-05-18

## 2020-05-18 RX ORDER — MORPHINE SULFATE 4 MG/ML
4 INJECTION, SOLUTION INTRAMUSCULAR; INTRAVENOUS ONCE
Status: DISCONTINUED | OUTPATIENT
Start: 2020-05-18 | End: 2020-05-18

## 2020-05-18 RX ADMIN — POTASSIUM CHLORIDE 10 MEQ: 7.46 INJECTION, SOLUTION INTRAVENOUS at 14:10

## 2020-05-18 RX ADMIN — HALOPERIDOL LACTATE 5 MG: 5 INJECTION, SOLUTION INTRAMUSCULAR at 14:11

## 2020-05-18 RX ADMIN — SODIUM CHLORIDE 1000 ML: 9 INJECTION, SOLUTION INTRAVENOUS at 14:09

## 2020-05-18 ASSESSMENT — PAIN DESCRIPTION - LOCATION: LOCATION: ABDOMEN

## 2020-05-18 ASSESSMENT — PAIN DESCRIPTION - PROGRESSION: CLINICAL_PROGRESSION: GRADUALLY WORSENING

## 2020-05-18 ASSESSMENT — PAIN SCALES - GENERAL
PAINLEVEL_OUTOF10: 10
PAINLEVEL_OUTOF10: 10

## 2020-05-18 ASSESSMENT — PAIN DESCRIPTION - PAIN TYPE: TYPE: ACUTE PAIN

## 2020-05-18 ASSESSMENT — PAIN DESCRIPTION - DESCRIPTORS: DESCRIPTORS: CRAMPING;SHARP

## 2020-05-18 ASSESSMENT — PAIN DESCRIPTION - FREQUENCY: FREQUENCY: CONTINUOUS

## 2020-05-18 ASSESSMENT — PAIN DESCRIPTION - ONSET: ONSET: PROGRESSIVE

## 2020-05-18 NOTE — ED NOTES
[place] and lactic acid was drawn on patient in triage     Miladis Harsha.  [facility]  05/18/20 2134

## 2020-05-18 NOTE — ED PROVIDER NOTES
(ZOFRAN ODT) 4 MG disintegrating tablet Take 1 tablet by mouth every 6 hours 10 tablet 0    dicyclomine (BENTYL) 10 MG capsule Take 2 capsules by mouth 4 times daily (before meals and nightly) 30 capsule 0    promethazine (PROMETHEGAN) 25 MG suppository Place 1 suppository rectally every 6 hours as needed for Nausea 10 suppository 0    famotidine (PEPCID) 20 MG tablet Take 1 tablet by mouth 2 times daily 30 tablet 0    metoclopramide (REGLAN) 10 MG tablet Take 1 tablet by mouth 4 times daily for 7 days 20 tablet 0    ranitidine (ZANTAC) 300 MG tablet Take 1 tablet by mouth nightly 30 tablet 0    hydrOXYzine (VISTARIL) 25 MG capsule Take 1 capsule by mouth three times daily 30 capsule 1    acetaminophen (APAP EXTRA STRENGTH) 500 MG tablet Take 1 tablet by mouth every 6 hours as needed for Pain 30 tablet 0    traZODone (DESYREL) 50 MG tablet Take 50 mg by mouth nightly      lurasidone (LATUDA) 20 MG TABS tablet Take 20 mg by mouth daily      divalproex (DEPAKOTE) 500 MG DR tablet Take 500 mg by mouth nightly.  ARIPiprazole (ABILIFY) 5 MG tablet Take 10 mg by mouth daily.  ibuprofen (IBU) 400 MG tablet Take 1 tablet by mouth every 6 hours as needed for Pain.  15 tablet 0       ALLERGIES    No Known Allergies    SOCIAL AND FAMILY HISTORY    Social History     Socioeconomic History    Marital status: Single     Spouse name: None    Number of children: None    Years of education: None    Highest education level: None   Occupational History    None   Social Needs    Financial resource strain: None    Food insecurity     Worry: None     Inability: None    Transportation needs     Medical: None     Non-medical: None   Tobacco Use    Smoking status: Current Every Day Smoker     Packs/day: 0.50     Types: Cigarettes    Smokeless tobacco: Never Used   Substance and Sexual Activity    Alcohol use: Yes     Comment: rarely    Drug use: Yes     Types: Marijuana    Sexual activity: Never   Lifestyle  Physical activity     Days per week: None     Minutes per session: None    Stress: None   Relationships    Social connections     Talks on phone: None     Gets together: None     Attends Baptism service: None     Active member of club or organization: None     Attends meetings of clubs or organizations: None     Relationship status: None    Intimate partner violence     Fear of current or ex partner: None     Emotionally abused: None     Physically abused: None     Forced sexual activity: None   Other Topics Concern    None   Social History Narrative    None     Family History   Problem Relation Age of Onset    Asthma Mother        PHYSICAL EXAM    VITAL SIGNS: BP (!) 150/108   Pulse 78   Temp 97.8 °F (36.6 °C) (Oral)   Resp 18   Ht 5' 4\" (1.626 m)   Wt 209 lb (94.8 kg)   SpO2 98%   BMI 35.87 kg/m²   Constitutional:  Well developed, well nourished. Appears uncomfortable, laying on side with knees to abdomen. Eyes:  Sclera nonicteric, conjunctiva moist  HENT:  Atraumatic. PERRL. EOMI. Moist mucus membranes. Neck/Lymphatics: supple, no JVD, no swollen nodes  Respiratory:  No retractions, no accessory muscle use, normal breath sounds   Cardiovascular:   normal rate, normal rhythm, no murmurs    GI:     No gross discoloration. Bowel sounds present, no audible bruits. Soft,  no distention, no guarding, no rigidity,   + diffuse abdominal discomfort palpation, significant epigastric and right upper quadrant, no rebound tenderness, no palpable pulsatile masses,    Back:   No CVA tenderness to percussion.   Musculoskeletal:  No edema, no deformity  Vascular: DP pulses 2+ equal bilaterally  Integument: No rash, dry skin  Neurologic:  Alert & oriented, normal speech  Psychiatric: Cooperative, pleasant affect       LABS:  Results for orders placed or performed during the hospital encounter of 05/18/20   CBC Auto Differential   Result Value Ref Range    WBC 10.6 (H) 4.0 - 10.5 K/CU MM    RBC 5.47 (H) 4.2 - 5.4 M/CU MM    Hemoglobin 16.1 (H) 12.5 - 16.0 GM/DL    Hematocrit 47.3 (H) 37 - 47 %    MCV 86.5 78 - 100 FL    MCH 29.4 27 - 31 PG    MCHC 34.0 32.0 - 36.0 %    RDW 12.5 11.7 - 14.9 %    Platelets 739 184 - 503 K/CU MM    MPV 9.6 7.5 - 11.1 FL    Differential Type AUTOMATED DIFFERENTIAL     Segs Relative 57.8 36 - 66 %    Lymphocytes % 28.9 24 - 44 %    Monocytes % 11.4 (H) 0 - 4 %    Eosinophils % 0.9 0 - 3 %    Basophils % 0.5 0 - 1 %    Segs Absolute 6.1 K/CU MM    Lymphocytes Absolute 3.1 K/CU MM    Monocytes Absolute 1.2 K/CU MM    Eosinophils Absolute 0.1 K/CU MM    Basophils Absolute 0.1 K/CU MM    Nucleated RBC % 0.0 %    Total Nucleated RBC 0.0 K/CU MM    Total Immature Neutrophil 0.05 K/CU MM    Immature Neutrophil % 0.5 (H) 0 - 0.43 %   CMP   Result Value Ref Range    Sodium 131 (L) 135 - 145 MMOL/L    Potassium 3.0 (LL) 3.5 - 5.1 MMOL/L    Chloride 92 (L) 99 - 110 mMol/L    CO2 23 21 - 32 MMOL/L    BUN 11 6 - 23 MG/DL    CREATININE 0.8 0.6 - 1.1 MG/DL    Glucose 124 (H) 70 - 99 MG/DL    Calcium 9.5 8.3 - 10.6 MG/DL    Alb 4.5 3.4 - 5.0 GM/DL    Total Protein 7.9 6.4 - 8.2 GM/DL    Total Bilirubin 1.8 (H) 0.0 - 1.0 MG/DL    ALT 83 (H) 10 - 40 U/L    AST 39 (H) 15 - 37 IU/L    Alkaline Phosphatase 41 40 - 129 IU/L    GFR Non-African American >60 >60 mL/min/1.73m2    GFR African American >60 >60 mL/min/1.73m2    Anion Gap 16 4 - 16   Lipase   Result Value Ref Range    Lipase 20 13 - 60 IU/L   HCG Serum, Quantitative   Result Value Ref Range    hCG Quant <0.5 UIU/ML           RADIOLOGY/PROCEDURES    US ABDOMEN LIMITED   Final Result   Unremarkable right upper quadrant ultrasound. ED COURSE & MEDICAL DECISION MAKING       Vital signs and nursing notes reviewed during ED course. I have independently evaluated this patient . Supervising MD present in the Emergency Department, available for consultation, throughout entirety of  patient care.     With ongoing COVID-19 pandemic, I did wear APRN - CNP  [address] [facility] [address]  [phone]    Schedule an appointment as soon as possible for a visit in 1 day  For recheck of symptoms treated for today    [facility] Emergency Department  [address] [phone]  Go to   As needed, If symptoms worsen      Disposition medications (if applicable):  New Prescriptions    DICYCLOMINE (BENTYL) 10 MG CAPSULE    Take 2 capsules by mouth 4 times daily (before meals and nightly)    ONDANSETRON (ZOFRAN ODT) 4 MG DISINTEGRATING TABLET    Take 1 tablet by mouth every 6 hours         Comment: Please note this report has been produced using speech recognition software and may contain errors related to that system including errors in grammar, punctuation, and spelling, as well as words and phrases that may be inappropriate. If there are any questions or concerns please feel free to contact the dictating provider for clarification.         DELBERT Fan  05/18/20 7942

## 2020-05-19 ENCOUNTER — APPOINTMENT (OUTPATIENT)
Dept: CT IMAGING | Age: 20
End: 2020-05-19
Payer: COMMERCIAL

## 2020-05-19 LAB
AMPHETAMINES: NEGATIVE
BACTERIA: ABNORMAL /HPF
BARBITURATE SCREEN URINE: NEGATIVE
BENZODIAZEPINE SCREEN, URINE: NEGATIVE
BILIRUBIN URINE: NEGATIVE MG/DL
BLOOD, URINE: ABNORMAL
CANNABINOID SCREEN URINE: ABNORMAL
CLARITY: ABNORMAL
COCAINE METABOLITE: NEGATIVE
COLOR: ABNORMAL
GLUCOSE, URINE: NEGATIVE MG/DL
HYALINE CASTS: 0 /LPF
INTERPRETATION: NORMAL
KETONES, URINE: ABNORMAL MG/DL
LEUKOCYTE ESTERASE, URINE: NEGATIVE
MUCUS: ABNORMAL HPF
NITRITE URINE, QUANTITATIVE: NEGATIVE
NON SQUAM EPI CELLS: <1 /HPF
OPIATES, URINE: NEGATIVE
OXYCODONE: NEGATIVE
PH, URINE: 6 (ref 5–8)
PHENCYCLIDINE, URINE: NEGATIVE
PREGNANCY, URINE: NEGATIVE
PROTEIN UA: NEGATIVE MG/DL
RBC URINE: 4 /HPF (ref 0–6)
SPECIFIC GRAVITY UA: 1.02 (ref 1–1.03)
SPECIFIC GRAVITY, URINE: 1.02 (ref 1–1.03)
SQUAMOUS EPITHELIAL: 17 /HPF
TRICHOMONAS: ABNORMAL /HPF
UROBILINOGEN, URINE: 2 MG/DL (ref 0.2–1)
WBC UA: 6 /HPF (ref 0–5)

## 2020-05-19 PROCEDURE — 80307 DRUG TEST PRSMV CHEM ANLYZR: CPT

## 2020-05-19 PROCEDURE — 2580000003 HC RX 258: Performed by: EMERGENCY MEDICINE

## 2020-05-19 PROCEDURE — 96374 THER/PROPH/DIAG INJ IV PUSH: CPT

## 2020-05-19 PROCEDURE — 81001 URINALYSIS AUTO W/SCOPE: CPT

## 2020-05-19 PROCEDURE — 96372 THER/PROPH/DIAG INJ SC/IM: CPT

## 2020-05-19 PROCEDURE — 81025 URINE PREGNANCY TEST: CPT

## 2020-05-19 PROCEDURE — 6360000004 HC RX CONTRAST MEDICATION: Performed by: EMERGENCY MEDICINE

## 2020-05-19 PROCEDURE — 6360000002 HC RX W HCPCS: Performed by: EMERGENCY MEDICINE

## 2020-05-19 PROCEDURE — 74177 CT ABD & PELVIS W/CONTRAST: CPT

## 2020-05-19 PROCEDURE — 6370000000 HC RX 637 (ALT 250 FOR IP): Performed by: EMERGENCY MEDICINE

## 2020-05-19 RX ORDER — HALOPERIDOL 5 MG/ML
INJECTION INTRAMUSCULAR
Status: DISCONTINUED
Start: 2020-05-19 | End: 2020-05-19 | Stop reason: HOSPADM

## 2020-05-19 RX ORDER — SODIUM CHLORIDE, SODIUM LACTATE, POTASSIUM CHLORIDE, CALCIUM CHLORIDE 600; 310; 30; 20 MG/100ML; MG/100ML; MG/100ML; MG/100ML
1000 INJECTION, SOLUTION INTRAVENOUS ONCE
Status: COMPLETED | OUTPATIENT
Start: 2020-05-19 | End: 2020-05-19

## 2020-05-19 RX ORDER — POTASSIUM BICARBONATE 25 MEQ/1
50 TABLET, EFFERVESCENT ORAL ONCE
Status: DISCONTINUED | OUTPATIENT
Start: 2020-05-19 | End: 2020-05-19 | Stop reason: CLARIF

## 2020-05-19 RX ORDER — HALOPERIDOL 5 MG/ML
5 INJECTION INTRAMUSCULAR ONCE
Status: COMPLETED | OUTPATIENT
Start: 2020-05-19 | End: 2020-05-19

## 2020-05-19 RX ORDER — ONDANSETRON 4 MG/1
4 TABLET, ORALLY DISINTEGRATING ORAL EVERY 8 HOURS PRN
Qty: 15 TABLET | Refills: 0 | Status: SHIPPED | OUTPATIENT
Start: 2020-05-19 | End: 2021-07-18

## 2020-05-19 RX ORDER — ONDANSETRON 2 MG/ML
4 INJECTION INTRAMUSCULAR; INTRAVENOUS EVERY 30 MIN PRN
Status: DISCONTINUED | OUTPATIENT
Start: 2020-05-19 | End: 2020-05-19 | Stop reason: HOSPADM

## 2020-05-19 RX ORDER — SODIUM CHLORIDE 0.9 % (FLUSH) 0.9 %
10 SYRINGE (ML) INJECTION 2 TIMES DAILY
Status: DISCONTINUED | OUTPATIENT
Start: 2020-05-19 | End: 2020-05-19 | Stop reason: HOSPADM

## 2020-05-19 RX ORDER — METOCLOPRAMIDE 10 MG/1
10 TABLET ORAL
Qty: 120 TABLET | Refills: 3 | Status: SHIPPED | OUTPATIENT
Start: 2020-05-19

## 2020-05-19 RX ADMIN — SODIUM CHLORIDE, POTASSIUM CHLORIDE, SODIUM LACTATE AND CALCIUM CHLORIDE 1000 ML: 600; 310; 30; 20 INJECTION, SOLUTION INTRAVENOUS at 00:24

## 2020-05-19 RX ADMIN — ONDANSETRON 4 MG: 2 INJECTION INTRAMUSCULAR; INTRAVENOUS at 01:55

## 2020-05-19 RX ADMIN — SODIUM CHLORIDE, PRESERVATIVE FREE 10 ML: 5 INJECTION INTRAVENOUS at 03:11

## 2020-05-19 RX ADMIN — POTASSIUM BICARBONATE 40 MEQ: 782 TABLET, EFFERVESCENT ORAL at 00:31

## 2020-05-19 RX ADMIN — HALOPERIDOL LACTATE 5 MG: 5 INJECTION, SOLUTION INTRAMUSCULAR at 00:23

## 2020-05-19 RX ADMIN — IOPAMIDOL 75 ML: 755 INJECTION, SOLUTION INTRAVENOUS at 02:07

## 2020-05-19 ASSESSMENT — ENCOUNTER SYMPTOMS
EYE REDNESS: 0
STRIDOR: 0
BLOOD IN STOOL: 0
BACK PAIN: 0
EYE ITCHING: 0
SHORTNESS OF BREATH: 0
COUGH: 0
SINUS PAIN: 0
RHINORRHEA: 0
VOICE CHANGE: 0
TROUBLE SWALLOWING: 0
VOMITING: 0
WHEEZING: 0
CHEST TIGHTNESS: 0
DIARRHEA: 0
EYE DISCHARGE: 0
APNEA: 0
SINUS PRESSURE: 0
EYES NEGATIVE: 1
FACIAL SWELLING: 0
EYE PAIN: 0
CHOKING: 0
PHOTOPHOBIA: 0
NAUSEA: 1
CONSTIPATION: 0
ABDOMINAL PAIN: 1
RESPIRATORY NEGATIVE: 1
RECTAL PAIN: 0
SORE THROAT: 0

## 2020-05-19 NOTE — ED PROVIDER NOTES
[address] ENCOUNTER      Pt Name: Talib Atkinson  MRN: [id number]  [place] 2000  Date of evaluation: 5/18/2020  Provider: Imelda Myers DO    CHIEF COMPLAINT       Chief Complaint   Patient presents with    Abdominal Pain         HISTORY OF PRESENT ILLNESS      Talib Atkinson is a 21 y.o. female who presents to the emergency department  for   Chief Complaint   Patient presents with    Abdominal Pain       The history is provided by the patient. No  was used. Nausea & Vomiting   Severity:  Moderate  Duration:  2 days  Timing:  Intermittent  Quality:  Undigested food  Able to tolerate:  Liquids  Progression:  Worsening  Chronicity:  Recurrent  Recent urination:  Normal  Context: not post-tussive and not self-induced    Relieved by:  Nothing  Worsened by:  Nothing  Ineffective treatments:  None tried  Associated symptoms: abdominal pain    Associated symptoms: no arthralgias, no chills, no cough, no diarrhea, no fever, no headaches, no myalgias, no sore throat and no URI    Risk factors: no alcohol use, no diabetes, not pregnant, no prior abdominal surgery, no sick contacts, no suspect food intake and no travel to endemic areas          Nursing Notes, Triage Notes & Vital Signs were reviewed. REVIEW OF SYSTEMS    (2-9 systems for level 4, 10 or more for level 5)     Review of Systems   Constitutional: Negative. Negative for activity change, appetite change, chills, fatigue and fever. HENT: Negative. Negative for congestion, dental problem, drooling, facial swelling, nosebleeds, postnasal drip, rhinorrhea, sinus pressure, sinus pain, sore throat, tinnitus, trouble swallowing and voice change. Eyes: Negative. Negative for photophobia, pain, discharge, redness, itching and visual disturbance. Respiratory: Negative.   Negative for apnea, cough, choking, chest tightness, shortness of Emotionally abused: None     Physically abused: None     Forced sexual activity: None   Other Topics Concern    None   Social History Narrative    None       SCREENINGS    [place] Coma Scale  Eye Opening: Spontaneous  Best Verbal Response: Oriented  Best Motor Response: Obeys commands  [place] Coma Scale Score: 15          PHYSICAL EXAM    (up to 7 for level 4, 8 or more for level 5)     ED Triage Vitals   BP Temp Temp Source Pulse Resp SpO2 Height Weight   05/18/20 2352 05/18/20 2352 05/18/20 2352 05/18/20 2352 05/18/20 2352 05/18/20 2352 05/18/20 2336 05/18/20 2336   (!) 156/108 99.6 °F (37.6 °C) Oral 84 16 99 % 5' 4\" (1.626 m) 209 lb (94.8 kg)       Physical Exam  Vitals signs and nursing note reviewed. Constitutional:       General: She is not in acute distress. Appearance: She is well-developed. She is not diaphoretic. HENT:      Head: Normocephalic and atraumatic. Right Ear: External ear normal.      Left Ear: External ear normal.      Nose: Nose normal.      Mouth/Throat:      Pharynx: No oropharyngeal exudate. Eyes:      General: No scleral icterus. Right eye: No discharge. Left eye: No discharge. Pupils: Pupils are equal, round, and reactive to light. Neck:      Musculoskeletal: Normal range of motion. Thyroid: No thyromegaly. Vascular: No JVD. Trachea: No tracheal deviation. Cardiovascular:      Rate and Rhythm: Normal rate and regular rhythm. Heart sounds: Normal heart sounds. No murmur. No friction rub. No gallop. Pulmonary:      Effort: Pulmonary effort is normal. No respiratory distress. Breath sounds: Normal breath sounds. No stridor. No wheezing or rales. Chest:      Chest wall: No tenderness. Abdominal:      General: Bowel sounds are normal. There is no distension. Palpations: Abdomen is soft. There is no mass. Tenderness: There is no abdominal tenderness. There is no guarding or rebound.    Musculoskeletal: Normal LOTN Apply 1 applicator topically 2 times daily, Disp-2 Bottle, R-1Print       !! - Potential duplicate medications found. Please discuss with provider. ED Provider Disposition Time  DISPOSITION Decision To Discharge 05/19/2020 02:48:20 AM      Appropriate personal protective equipment was worn during the patient's evaluation. These included surgical, eye protection, surgical mask or in 95 respirator and gloves. The patient was also placed in a surgical mask for the prevention of possible spread of respiratory viral illnesses. The Patient was instructed to read the package inserts with any medication that was prescribed. Major potential reactions and medication interactions were discussed. The Patient understands that there are numerous possible adverse reactions not covered. The patient was also instructed to arrange follow-up with his or her primary care provider for review of any pending labwork or incidental findings on any radiology results that were obtained. All efforts were made to discuss any incidental findings that require further monitoring. Controlled Substances Monitoring:     No flowsheet data found.     (Please note that portions of this note were completed with a voice recognition program.  Efforts were made to edit the dictations but occasionally words are mis-transcribed.)    Jannet Fierro DO (electronically signed)  Attending Emergency Physician            Jannet Fierro,   05/19/20 Margaux Sutton 24, DO  05/19/20 0321

## 2021-07-18 ENCOUNTER — HOSPITAL ENCOUNTER (EMERGENCY)
Age: 21
Discharge: HOME | End: 2021-07-18
Payer: COMMERCIAL

## 2021-07-18 VITALS
DIASTOLIC BLOOD PRESSURE: 83 MMHG | SYSTOLIC BLOOD PRESSURE: 129 MMHG | RESPIRATION RATE: 16 BRPM | OXYGEN SATURATION: 98 % | BODY MASS INDEX: 39.27 KG/M2 | HEART RATE: 63 BPM | WEIGHT: 230 LBS | TEMPERATURE: 97.8 F | HEIGHT: 64 IN

## 2021-07-18 DIAGNOSIS — R11.2 NON-INTRACTABLE VOMITING WITH NAUSEA, UNSPECIFIED VOMITING TYPE: Primary | ICD-10-CM

## 2021-07-18 DIAGNOSIS — R82.5 POSITIVE URINE DRUG SCREEN: ICD-10-CM

## 2021-07-18 DIAGNOSIS — R19.7 DIARRHEA, UNSPECIFIED TYPE: ICD-10-CM

## 2021-07-18 LAB
ALBUMIN SERPL-MCNC: 5.1 GM/DL (ref 3.4–5)
ALP BLD-CCNC: 50 IU/L (ref 40–129)
ALT SERPL-CCNC: 31 U/L (ref 10–40)
AMPHETAMINES: NEGATIVE
ANION GAP SERPL CALCULATED.3IONS-SCNC: 15 MMOL/L (ref 4–16)
AST SERPL-CCNC: 23 IU/L (ref 15–37)
BACTERIA: ABNORMAL /HPF
BARBITURATE SCREEN URINE: NEGATIVE
BASOPHILS ABSOLUTE: 0 K/CU MM
BASOPHILS RELATIVE PERCENT: 0.2 % (ref 0–1)
BENZODIAZEPINE SCREEN, URINE: NEGATIVE
BILIRUB SERPL-MCNC: 0.8 MG/DL (ref 0–1)
BILIRUBIN URINE: NEGATIVE MG/DL
BLOOD, URINE: ABNORMAL
BUN BLDV-MCNC: 18 MG/DL (ref 6–23)
CALCIUM OXALATE CRYSTALS: ABNORMAL /HPF
CALCIUM SERPL-MCNC: 9.7 MG/DL (ref 8.3–10.6)
CANNABINOID SCREEN URINE: ABNORMAL
CHLORIDE BLD-SCNC: 95 MMOL/L (ref 99–110)
CLARITY: ABNORMAL
CO2: 26 MMOL/L (ref 21–32)
COCAINE METABOLITE: ABNORMAL
COLOR: ABNORMAL
CREAT SERPL-MCNC: 0.7 MG/DL (ref 0.6–1.1)
DIFFERENTIAL TYPE: ABNORMAL
EOSINOPHILS ABSOLUTE: 0 K/CU MM
EOSINOPHILS RELATIVE PERCENT: 0 % (ref 0–3)
GFR AFRICAN AMERICAN: >60 ML/MIN/1.73M2
GFR NON-AFRICAN AMERICAN: >60 ML/MIN/1.73M2
GLUCOSE BLD-MCNC: 121 MG/DL (ref 70–99)
GLUCOSE, URINE: NEGATIVE MG/DL
HCG QUALITATIVE: NEGATIVE
HCT VFR BLD CALC: 46.2 % (ref 37–47)
HEMOGLOBIN: 16.2 GM/DL (ref 12.5–16)
HYALINE CASTS: 2 /LPF
IMMATURE NEUTROPHIL %: 0.3 % (ref 0–0.43)
KETONES, URINE: ABNORMAL MG/DL
LEUKOCYTE ESTERASE, URINE: ABNORMAL
LIPASE: 16 IU/L (ref 13–60)
LYMPHOCYTES ABSOLUTE: 1.3 K/CU MM
LYMPHOCYTES RELATIVE PERCENT: 11.2 % (ref 24–44)
MAGNESIUM: 2 MG/DL (ref 1.8–2.4)
MCH RBC QN AUTO: 30.2 PG (ref 27–31)
MCHC RBC AUTO-ENTMCNC: 35.1 % (ref 32–36)
MCV RBC AUTO: 86.2 FL (ref 78–100)
MONOCYTES ABSOLUTE: 0.8 K/CU MM
MONOCYTES RELATIVE PERCENT: 7.1 % (ref 0–4)
MUCUS: ABNORMAL HPF
NITRITE URINE, QUANTITATIVE: NEGATIVE
NUCLEATED RBC %: 0 %
OPIATES, URINE: NEGATIVE
OXYCODONE: NEGATIVE
PDW BLD-RTO: 13.6 % (ref 11.7–14.9)
PH, URINE: 6 (ref 5–8)
PHENCYCLIDINE, URINE: NEGATIVE
PLATELET # BLD: 385 K/CU MM (ref 140–440)
PMV BLD AUTO: 9.6 FL (ref 7.5–11.1)
POTASSIUM SERPL-SCNC: 3.2 MMOL/L (ref 3.5–5.1)
PROTEIN UA: 30 MG/DL
RBC # BLD: 5.36 M/CU MM (ref 4.2–5.4)
RBC URINE: 4 /HPF (ref 0–6)
SEGMENTED NEUTROPHILS ABSOLUTE COUNT: 9.5 K/CU MM
SEGMENTED NEUTROPHILS RELATIVE PERCENT: 81.2 % (ref 36–66)
SODIUM BLD-SCNC: 136 MMOL/L (ref 135–145)
SPECIFIC GRAVITY UA: 1.03 (ref 1–1.03)
SQUAMOUS EPITHELIAL: 12 /HPF
TOTAL IMMATURE NEUTOROPHIL: 0.04 K/CU MM
TOTAL NUCLEATED RBC: 0 K/CU MM
TOTAL PROTEIN: 8.1 GM/DL (ref 6.4–8.2)
TRICHOMONAS: ABNORMAL /HPF
UROBILINOGEN, URINE: 2 MG/DL (ref 0.2–1)
WBC # BLD: 11.6 K/CU MM (ref 4–10.5)
WBC UA: 2 /HPF (ref 0–5)

## 2021-07-18 PROCEDURE — 80307 DRUG TEST PRSMV CHEM ANLYZR: CPT

## 2021-07-18 PROCEDURE — 36415 COLL VENOUS BLD VENIPUNCTURE: CPT

## 2021-07-18 PROCEDURE — 80053 COMPREHEN METABOLIC PANEL: CPT

## 2021-07-18 PROCEDURE — 83690 ASSAY OF LIPASE: CPT

## 2021-07-18 PROCEDURE — 84703 CHORIONIC GONADOTROPIN ASSAY: CPT

## 2021-07-18 PROCEDURE — 81001 URINALYSIS AUTO W/SCOPE: CPT

## 2021-07-18 PROCEDURE — 83735 ASSAY OF MAGNESIUM: CPT

## 2021-07-18 PROCEDURE — 85025 COMPLETE CBC W/AUTO DIFF WBC: CPT

## 2021-07-18 PROCEDURE — 6370000000 HC RX 637 (ALT 250 FOR IP): Performed by: PHYSICIAN ASSISTANT

## 2021-07-18 PROCEDURE — 99285 EMERGENCY DEPT VISIT HI MDM: CPT

## 2021-07-18 RX ORDER — ONDANSETRON 4 MG/1
4 TABLET, FILM COATED ORAL EVERY 8 HOURS PRN
Qty: 10 TABLET | Refills: 0 | Status: SHIPPED | OUTPATIENT
Start: 2021-07-18

## 2021-07-18 RX ORDER — ONDANSETRON 4 MG/1
4 TABLET, ORALLY DISINTEGRATING ORAL ONCE
Status: COMPLETED | OUTPATIENT
Start: 2021-07-18 | End: 2021-07-18

## 2021-07-18 RX ADMIN — ONDANSETRON 4 MG: 4 TABLET, ORALLY DISINTEGRATING ORAL at 09:43

## 2021-07-18 NOTE — ED NOTES
Discharge instructions gone over with patient at this time. Prescription sent for Zofran to patient's Pharmacy and education provided. Instructed to follow up with family physician in 3 days and to return to the ER if symptoms worsen.  Voiced understanding     Sathish Virgen RN  07/18/21 4432

## 2021-07-18 NOTE — ED PROVIDER NOTES
Roxy Roche DO   ondansetron (ZOFRAN ODT) 4 MG disintegrating tablet Take 1 tablet by mouth every 6 hours 5/18/20   DELBERT Kelley   dicyclomine (BENTYL) 10 MG capsule Take 2 capsules by mouth 4 times daily (before meals and nightly) 5/18/20   DELBERT Kelley   promethazine (PROMETHEGAN) 25 MG suppository Place 1 suppository rectally every 6 hours as needed for Nausea 4/16/20   Sadie Medrano MD   famotidine (PEPCID) 20 MG tablet Take 1 tablet by mouth 2 times daily 9/21/19   Dk Zhang PA-C   ranitidine (ZANTAC) 300 MG tablet Take 1 tablet by mouth nightly 9/1/18   Sherryle Lawyer, APRN - CNP   hydrOXYzine (VISTARIL) 25 MG capsule Take 1 capsule by mouth three times daily 5/10/17   Reece Severin Wagenknecht, PA-C   acetaminophen (APAP EXTRA STRENGTH) 500 MG tablet Take 1 tablet by mouth every 6 hours as needed for Pain 5/8/17   Reece Severin Wagenknecht, PA-C   traZODone (DESYREL) 50 MG tablet Take 50 mg by mouth nightly    Historical Provider, MD   lurasidone (LATUDA) 20 MG TABS tablet Take 20 mg by mouth daily    Historical Provider, MD   divalproex (DEPAKOTE) 500 MG DR tablet Take 500 mg by mouth nightly. Historical Provider, MD   ARIPiprazole (ABILIFY) 5 MG tablet Take 10 mg by mouth daily. Historical Provider, MD   ibuprofen (IBU) 400 MG tablet Take 1 tablet by mouth every 6 hours as needed for Pain. 2/25/12 2/24/13  Марина Winter MD       Social history:  reports that she has been smoking cigarettes. She has been smoking about 0.50 packs per day. She has never used smokeless tobacco. She reports current alcohol use. She reports current drug use. Drug: Marijuana. Family history:    Family History   Problem Relation Age of Onset    Asthma Mother          Exam  /83   Pulse 63   Temp 97.8 °F (36.6 °C) (Oral)   Resp 16   Ht 5' 4\" (1.626 m)   Wt 230 lb (104.3 kg)   LMP 07/18/2021 (Approximate)   SpO2 98%   BMI 39.48 kg/m²   Nursing note and vitals reviewed.   Constitutional: Well developed, well nourished. No acute distress. HENT:      Head: Normocephalic and atraumatic. Ears: External ears normal.      Nose: Nose normal.     Mouth: Membrane mucosa moist and pink. No posterior oropharynx erythema or tonsillar edema  Eyes: Anicteric sclera. No discharge, PERRL  Neck: Supple. Trachea midline. Cardiovascular: RRR, no murmurs, rubs, or gallops, radial pulses 2+ bilaterally. Pulmonary/Chest: Effort normal. No respiratory distress. CTAB. No stridor. No wheezes. No rales. Abdominal: Soft. Nontender to palpation. No distension. No guarding, rebound tenderness, or evidence of ascites. : No CVA tenderness. Musculoskeletal: Moves all extremities. No gross deformity. Neurological: Alert and oriented to person, place, and time. Normal muscle tone. Skin: Warm and dry. No rash. Psychiatric: Normal mood and affect.  Behavior is normal.      Radiographs (if obtained):  [] The following radiograph was interpreted by myself in the absence of a radiologist:   [x] Radiologist's Report Reviewed:  No orders to display          Labs  Results for orders placed or performed during the hospital encounter of 07/18/21   CBC Auto Differential   Result Value Ref Range    WBC 11.6 (H) 4.0 - 10.5 K/CU MM    RBC 5.36 4.2 - 5.4 M/CU MM    Hemoglobin 16.2 (H) 12.5 - 16.0 GM/DL    Hematocrit 46.2 37 - 47 %    MCV 86.2 78 - 100 FL    MCH 30.2 27 - 31 PG    MCHC 35.1 32.0 - 36.0 %    RDW 13.6 11.7 - 14.9 %    Platelets 503 441 - 222 K/CU MM    MPV 9.6 7.5 - 11.1 FL    Differential Type AUTOMATED DIFFERENTIAL     Segs Relative 81.2 (H) 36 - 66 %    Lymphocytes % 11.2 (L) 24 - 44 %    Monocytes % 7.1 (H) 0 - 4 %    Eosinophils % 0.0 0 - 3 %    Basophils % 0.2 0 - 1 %    Segs Absolute 9.5 K/CU MM    Lymphocytes Absolute 1.3 K/CU MM    Monocytes Absolute 0.8 K/CU MM    Eosinophils Absolute 0.0 K/CU MM    Basophils Absolute 0.0 K/CU MM    Nucleated RBC % 0.0 %    Total Nucleated RBC 0.0 K/CU MM    Total Immature Neutrophil 0.04 K/CU MM    Immature Neutrophil % 0.3 0 - 0.43 %   Comprehensive Metabolic Panel w/ Reflex to MG   Result Value Ref Range    Sodium 136 135 - 145 MMOL/L    Potassium 3.2 (L) 3.5 - 5.1 MMOL/L    Chloride 95 (L) 99 - 110 mMol/L    CO2 26 21 - 32 MMOL/L    BUN 18 6 - 23 MG/DL    CREATININE 0.7 0.6 - 1.1 MG/DL    Glucose 121 (H) 70 - 99 MG/DL    Calcium 9.7 8.3 - 10.6 MG/DL    Albumin 5.1 (H) 3.4 - 5.0 GM/DL    Total Protein 8.1 6.4 - 8.2 GM/DL    Total Bilirubin 0.8 0.0 - 1.0 MG/DL    ALT 31 10 - 40 U/L    AST 23 15 - 37 IU/L    Alkaline Phosphatase 50 40 - 129 IU/L    GFR Non-African American >60 >60 mL/min/1.73m2    GFR African American >60 >60 mL/min/1.73m2    Anion Gap 15 4 - 16   Lipase   Result Value Ref Range    Lipase 16 13 - 60 IU/L   Urinalysis Reflex to Culture    Specimen: Urine   Result Value Ref Range    Color, UA JOSE (A) YELLOW    Clarity, UA HAZY (A) CLEAR    Glucose, Urine NEGATIVE NEGATIVE MG/DL    Bilirubin Urine NEGATIVE NEGATIVE MG/DL    Ketones, Urine MODERATE (A) NEGATIVE MG/DL    Specific Gravity, UA 1.030 1.001 - 1.035    Blood, Urine LARGE (A) NEGATIVE    pH, Urine 6.0 5.0 - 8.0    Protein, UA 30 (A) NEGATIVE MG/DL    Urobilinogen, Urine 2.0 (H) 0.2 - 1.0 MG/DL    Nitrite Urine, Quantitative NEGATIVE NEGATIVE    Leukocyte Esterase, Urine TRACE (A) NEGATIVE    RBC, UA 4 0 - 6 /HPF    WBC, UA 2 0 - 5 /HPF    Bacteria, UA MODERATE (A) NEGATIVE /HPF    Squam Epithel, UA 12 /HPF    Mucus, UA OCCASIONAL (A) NEGATIVE HPF    Trichomonas, UA NONE SEEN NONE SEEN /HPF    Hyaline Casts, UA 2 /LPF    Ca Oxalate [place], UA OCCASIONAL /HPF   Urine Drug Screen   Result Value Ref Range    Cannabinoid Scrn, Ur UNCONFIRMED POSITIVE (A) NEGATIVE    Amphetamines NEGATIVE NEGATIVE    Cocaine Metabolite UNCONFIRMED POSITIVE (A) NEGATIVE    Benzodiazepine Screen, Urine NEGATIVE NEGATIVE    Barbiturate Screen, Ur NEGATIVE NEGATIVE    Opiates, Urine NEGATIVE NEGATIVE    Phencyclidine, Urine NEGATIVE NEGATIVE    Oxycodone NEGATIVE NEGATIVE   HCG Serum, Qualitative   Result Value Ref Range    hCG Qual NEGATIVE    Magnesium   Result Value Ref Range    Magnesium 2.0 1.8 - 2.4 mg/dl         MDM  Patient presents for vomiting and diarrhea. Vital signs unremarkable. Given oral Zofran in ED and now tolerating p.o., states feels much better. Laboratory work-up shows urine drug screen positive for cannabinoids and cocaine. Discussed with patient, she is equivocal about more recent use of marijuana but denies cocaine abuse. Recommended discontinuing all drug use, discussed possibility of marijuana causing some of her symptoms. Other laboratory testing shows mild hypokalemia, this should correct once patient is able to eat. Plan is to discharge with prescription for Zofran. I estimate there is LOW risk for ACUTE APPENDICITIS, BOWEL OBSTRUCTION, CHOLECYSTITIS, DIVERTICULITIS, INCARCERATED HERNIA, PANCREATITIS, PELVIC INFLAMMATORY DISEASE, PERFORATED BOWEL, PERFORATED OR BLEEDING ULCER, ECTOPIC PREGNANCY, TUBO-OVARIAN ABSCESS, thus I consider the discharge disposition reasonable. Adryan Hendrix and I have discussed the diagnosis and risks, and we agree with discharging home to follow-up with their primary doctor. We also discussed returning to the Emergency Department immediately if new or worsening symptoms occur. We have discussed the symptoms which are most concerning (e.g., bloody stool, fever, changing or worsening pain, intractable vomiting) that necessitate immediate return. I have independently evaluated this patient. Final Impression  1. Non-intractable vomiting with nausea, unspecified vomiting type    2. Diarrhea, unspecified type    3. Positive urine drug screen        Blood pressure 129/83, pulse 63, temperature 97.8 °F (36.6 °C), temperature source Oral, resp.  rate 16, height 5' 4\" (1.626 m), weight 230 lb (104.3 kg), last menstrual period 07/18/2021, SpO2 98 %, not currently

## 2021-07-20 ENCOUNTER — HOSPITAL ENCOUNTER (EMERGENCY)
Age: 21
Discharge: HOME | End: 2021-07-20
Attending: EMERGENCY MEDICINE
Payer: COMMERCIAL

## 2021-07-20 VITALS
HEART RATE: 82 BPM | DIASTOLIC BLOOD PRESSURE: 74 MMHG | OXYGEN SATURATION: 98 % | RESPIRATION RATE: 16 BRPM | SYSTOLIC BLOOD PRESSURE: 122 MMHG | HEIGHT: 64 IN | WEIGHT: 230 LBS | TEMPERATURE: 98 F | BODY MASS INDEX: 39.27 KG/M2

## 2021-07-20 DIAGNOSIS — E87.6 HYPOKALEMIA: ICD-10-CM

## 2021-07-20 DIAGNOSIS — F12.90 MARIJUANA USE: ICD-10-CM

## 2021-07-20 DIAGNOSIS — R11.2 NAUSEA AND VOMITING, INTRACTABILITY OF VOMITING NOT SPECIFIED, UNSPECIFIED VOMITING TYPE: Primary | ICD-10-CM

## 2021-07-20 LAB
ALBUMIN SERPL-MCNC: 4.6 GM/DL (ref 3.4–5)
ALP BLD-CCNC: 48 IU/L (ref 40–128)
ALT SERPL-CCNC: 31 U/L (ref 10–40)
ANION GAP SERPL CALCULATED.3IONS-SCNC: 16 MMOL/L (ref 4–16)
AST SERPL-CCNC: 19 IU/L (ref 15–37)
BACTERIA: ABNORMAL /HPF
BASOPHILS ABSOLUTE: 0.1 K/CU MM
BASOPHILS RELATIVE PERCENT: 0.4 % (ref 0–1)
BILIRUB SERPL-MCNC: 1.2 MG/DL (ref 0–1)
BILIRUBIN URINE: NEGATIVE MG/DL
BLOOD, URINE: ABNORMAL
BUN BLDV-MCNC: 15 MG/DL (ref 6–23)
CALCIUM SERPL-MCNC: 9.8 MG/DL (ref 8.3–10.6)
CHLORIDE BLD-SCNC: 92 MMOL/L (ref 99–110)
CLARITY: ABNORMAL
CO2: 26 MMOL/L (ref 21–32)
COLOR: ABNORMAL
CREAT SERPL-MCNC: 0.7 MG/DL (ref 0.6–1.1)
DIFFERENTIAL TYPE: ABNORMAL
EOSINOPHILS ABSOLUTE: 0.1 K/CU MM
EOSINOPHILS RELATIVE PERCENT: 1 % (ref 0–3)
GFR AFRICAN AMERICAN: >60 ML/MIN/1.73M2
GFR NON-AFRICAN AMERICAN: >60 ML/MIN/1.73M2
GLUCOSE BLD-MCNC: 114 MG/DL (ref 70–99)
GLUCOSE, URINE: NEGATIVE MG/DL
HCT VFR BLD CALC: 47.5 % (ref 37–47)
HEMOGLOBIN: 16.5 GM/DL (ref 12.5–16)
IMMATURE NEUTROPHIL %: 0.4 % (ref 0–0.43)
INTERPRETATION: NORMAL
KETONES, URINE: ABNORMAL MG/DL
LEUKOCYTE ESTERASE, URINE: NEGATIVE
LYMPHOCYTES ABSOLUTE: 3.5 K/CU MM
LYMPHOCYTES RELATIVE PERCENT: 30.6 % (ref 24–44)
MCH RBC QN AUTO: 29.8 PG (ref 27–31)
MCHC RBC AUTO-ENTMCNC: 34.7 % (ref 32–36)
MCV RBC AUTO: 85.7 FL (ref 78–100)
MONOCYTES ABSOLUTE: 1.2 K/CU MM
MONOCYTES RELATIVE PERCENT: 10.7 % (ref 0–4)
NITRITE URINE, QUANTITATIVE: NEGATIVE
NUCLEATED RBC %: 0 %
PDW BLD-RTO: 13.1 % (ref 11.7–14.9)
PH, URINE: 6 (ref 5–8)
PLATELET # BLD: 385 K/CU MM (ref 140–440)
PMV BLD AUTO: 9.4 FL (ref 7.5–11.1)
POTASSIUM SERPL-SCNC: 3 MMOL/L (ref 3.5–5.1)
PREGNANCY, URINE: NEGATIVE
PROTEIN UA: 100 MG/DL
RBC # BLD: 5.54 M/CU MM (ref 4.2–5.4)
RBC URINE: 2600 /HPF (ref 0–6)
SEGMENTED NEUTROPHILS ABSOLUTE COUNT: 6.4 K/CU MM
SEGMENTED NEUTROPHILS RELATIVE PERCENT: 56.9 % (ref 36–66)
SODIUM BLD-SCNC: 134 MMOL/L (ref 135–145)
SPECIFIC GRAVITY UA: 1.02 (ref 1–1.03)
SPECIFIC GRAVITY, URINE: 1.02 (ref 1–1.03)
TOTAL IMMATURE NEUTOROPHIL: 0.04 K/CU MM
TOTAL NUCLEATED RBC: 0 K/CU MM
TOTAL PROTEIN: 8 GM/DL (ref 6.4–8.2)
TRICHOMONAS: ABNORMAL /HPF
UROBILINOGEN, URINE: NEGATIVE MG/DL (ref 0.2–1)
WBC # BLD: 11.3 K/CU MM (ref 4–10.5)
WBC UA: 116 /HPF (ref 0–5)

## 2021-07-20 PROCEDURE — 96375 TX/PRO/DX INJ NEW DRUG ADDON: CPT

## 2021-07-20 PROCEDURE — 6360000002 HC RX W HCPCS: Performed by: EMERGENCY MEDICINE

## 2021-07-20 PROCEDURE — 80053 COMPREHEN METABOLIC PANEL: CPT

## 2021-07-20 PROCEDURE — 81025 URINE PREGNANCY TEST: CPT

## 2021-07-20 PROCEDURE — 2580000003 HC RX 258: Performed by: EMERGENCY MEDICINE

## 2021-07-20 PROCEDURE — 6370000000 HC RX 637 (ALT 250 FOR IP): Performed by: EMERGENCY MEDICINE

## 2021-07-20 PROCEDURE — 99285 EMERGENCY DEPT VISIT HI MDM: CPT

## 2021-07-20 PROCEDURE — 81001 URINALYSIS AUTO W/SCOPE: CPT

## 2021-07-20 PROCEDURE — 85025 COMPLETE CBC W/AUTO DIFF WBC: CPT

## 2021-07-20 PROCEDURE — 96365 THER/PROPH/DIAG IV INF INIT: CPT

## 2021-07-20 PROCEDURE — 96366 THER/PROPH/DIAG IV INF ADDON: CPT

## 2021-07-20 PROCEDURE — 2500000003 HC RX 250 WO HCPCS: Performed by: EMERGENCY MEDICINE

## 2021-07-20 PROCEDURE — 96367 TX/PROPH/DG ADDL SEQ IV INF: CPT

## 2021-07-20 RX ORDER — PROMETHAZINE HYDROCHLORIDE 25 MG/1
25 TABLET ORAL EVERY 6 HOURS PRN
Qty: 15 TABLET | Refills: 0 | Status: SHIPPED | OUTPATIENT
Start: 2021-07-20 | End: 2021-07-27

## 2021-07-20 RX ORDER — POTASSIUM CHLORIDE 20 MEQ/1
40 TABLET, EXTENDED RELEASE ORAL ONCE
Status: COMPLETED | OUTPATIENT
Start: 2021-07-20 | End: 2021-07-20

## 2021-07-20 RX ORDER — DIPHENHYDRAMINE HYDROCHLORIDE 50 MG/ML
25 INJECTION INTRAMUSCULAR; INTRAVENOUS ONCE
Status: DISCONTINUED | OUTPATIENT
Start: 2021-07-20 | End: 2021-07-20

## 2021-07-20 RX ORDER — HALOPERIDOL 5 MG/ML
5 INJECTION INTRAMUSCULAR ONCE
Status: COMPLETED | OUTPATIENT
Start: 2021-07-20 | End: 2021-07-20

## 2021-07-20 RX ORDER — DIPHENHYDRAMINE HYDROCHLORIDE 50 MG/ML
50 INJECTION INTRAMUSCULAR; INTRAVENOUS ONCE
Status: COMPLETED | OUTPATIENT
Start: 2021-07-20 | End: 2021-07-20

## 2021-07-20 RX ORDER — MAGNESIUM SULFATE IN WATER 40 MG/ML
2000 INJECTION, SOLUTION INTRAVENOUS ONCE
Status: COMPLETED | OUTPATIENT
Start: 2021-07-20 | End: 2021-07-20

## 2021-07-20 RX ORDER — POTASSIUM CHLORIDE 7.45 MG/ML
10 INJECTION INTRAVENOUS
Status: COMPLETED | OUTPATIENT
Start: 2021-07-20 | End: 2021-07-20

## 2021-07-20 RX ORDER — 0.9 % SODIUM CHLORIDE 0.9 %
1000 INTRAVENOUS SOLUTION INTRAVENOUS ONCE
Status: COMPLETED | OUTPATIENT
Start: 2021-07-20 | End: 2021-07-20

## 2021-07-20 RX ADMIN — FAMOTIDINE 20 MG: 10 INJECTION, SOLUTION INTRAVENOUS at 05:13

## 2021-07-20 RX ADMIN — MAGNESIUM SULFATE HEPTAHYDRATE 2000 MG: 40 INJECTION, SOLUTION INTRAVENOUS at 05:25

## 2021-07-20 RX ADMIN — POTASSIUM CHLORIDE 10 MEQ: 7.46 INJECTION, SOLUTION INTRAVENOUS at 06:42

## 2021-07-20 RX ADMIN — HALOPERIDOL LACTATE 5 MG: 5 INJECTION, SOLUTION INTRAMUSCULAR at 05:13

## 2021-07-20 RX ADMIN — SODIUM CHLORIDE 1000 ML: 9 INJECTION, SOLUTION INTRAVENOUS at 05:12

## 2021-07-20 RX ADMIN — POTASSIUM CHLORIDE 40 MEQ: 20 TABLET, EXTENDED RELEASE ORAL at 06:42

## 2021-07-20 RX ADMIN — POTASSIUM CHLORIDE 10 MEQ: 7.46 INJECTION, SOLUTION INTRAVENOUS at 07:43

## 2021-07-20 RX ADMIN — DIPHENHYDRAMINE HYDROCHLORIDE 50 MG: 50 INJECTION INTRAMUSCULAR; INTRAVENOUS at 05:13

## 2021-07-20 ASSESSMENT — PAIN DESCRIPTION - PAIN TYPE: TYPE: ACUTE PAIN

## 2021-07-20 ASSESSMENT — PAIN SCALES - GENERAL
PAINLEVEL_OUTOF10: 0
PAINLEVEL_OUTOF10: 8

## 2021-07-20 ASSESSMENT — PAIN DESCRIPTION - LOCATION: LOCATION: ABDOMEN

## 2021-07-20 NOTE — ED PROVIDER NOTES
CHIEF COMPLAINT    Chief Complaint   Patient presents with    Abdominal Pain     x 6 days    Emesis    Nausea     HPI  Belinda Stratton is a 24 y.o. female with history of bipolar disorder who presents to the ED with complaints of intractable nausea and vomiting with abdominal cramping. Patient evaluated in this emergency department 2 days ago with similar complaints of nausea, vomiting, and abdominal cramping. Her laboratory studies at that time demonstrated she was positive for marijuana and cocaine. Urine pregnancy screen was negative. At that time she was found to mildly hypokalemic. She was treated symptomatically and discharged home with a prescription for Zofran. It seems that she has multiple visits over the last year for intractable nausea and vomiting with imaging studies during that time which were reassuring. Patient states that since discharge she has continued to experience nausea and vomiting and abdominal cramping. Symptoms are constant exacerbated eating and drinking. Nothing makes them better. Denies fevers, chills, vaginal bleeding, vaginal discharge, dysuria. REVIEW OF SYSTEMS  Constitutional: No fever, chills or recent illness. Eye: No visual changes  HENT: No earache or sore throat. Resp: No SOB or productive cough. Cardio: No chest pain or palpitations. GI: Complains of intractable nausea and vomiting. No diarrhea or constipation  : No dysuria, urgency or frequency. Endocrine: No heat intolerance, no cold intolerance, no polydipsia   Lymphatics: No adenopathy  Musculoskeletal: No new muscle aches or joint pain. Neuro: No headaches. Psych: No homicidal or suicidal thoughts  Skin: No rash, No itching. ?  ?   PAST MEDICAL HISTORY  Past Medical History:   Diagnosis Date    ADHD (attention deficit hyperactivity disorder)     Anxiety     Bipolar 1 disorder ([facility] [address]     Depression      FAMILY HISTORY  Family History   Problem Relation Age of Onset    Asthma Mother SOCIAL HISTORY  Social History     Socioeconomic History    Marital status: Single     Spouse name: None    Number of children: None    Years of education: None    Highest education level: None   Occupational History    None   Tobacco Use    Smoking status: Current Every Day Smoker     Packs/day: 0.50     Types: Cigarettes    Smokeless tobacco: Never Used   Vaping Use    Vaping Use: Never used   Substance and Sexual Activity    Alcohol use: Yes     Comment: rarely    Drug use: Yes     Types: Marijuana    Sexual activity: Never   Other Topics Concern    None   Social History Narrative    None     Social Determinants of Health     Financial Resource Strain:     Difficulty of Paying Living Expenses:    Food Insecurity:     Worried About Running Out of Food in the Last Year:     Ran Out of Food in the Last Year:    Transportation Needs:     Lack of Transportation (Medical):  Lack of Transportation (Non-Medical):    Physical Activity:     Days of Exercise per Week:     Minutes of Exercise per Session:    Stress:     Feeling of Stress :    Social Connections:     Frequency of Communication with Friends and Family:     Frequency of Social Gatherings with Friends and Family:     Attends Yarsani Services:     Active Member of Clubs or Organizations:     Attends Club or Organization Meetings:     Marital Status:    Intimate Partner Violence:     Fear of Current or Ex-Partner:     Emotionally Abused:     Physically Abused:     Sexually Abused:        SURGICAL HISTORY  History reviewed. No pertinent surgical history. CURRENT MEDICATIONS  Previous Medications    ACETAMINOPHEN (APAP EXTRA STRENGTH) 500 MG TABLET    Take 1 tablet by mouth every 6 hours as needed for Pain    ARIPIPRAZOLE (ABILIFY) 5 MG TABLET    Take 10 mg by mouth daily.     CAPSAICIN 0.035 % LOTN    Apply 1 applicator topically 2 times daily    DICYCLOMINE (BENTYL) 10 MG CAPSULE    Take 2 capsules by mouth 4 times daily (before meals and nightly)    DIVALPROEX (DEPAKOTE) 500 MG DR TABLET    Take 500 mg by mouth nightly. FAMOTIDINE (PEPCID) 20 MG TABLET    Take 1 tablet by mouth 2 times daily    HYDROXYZINE (VISTARIL) 25 MG CAPSULE    Take 1 capsule by mouth three times daily    IBUPROFEN (IBU) 400 MG TABLET    Take 1 tablet by mouth every 6 hours as needed for Pain. LURASIDONE (LATUDA) 20 MG TABS TABLET    Take 20 mg by mouth daily    METOCLOPRAMIDE (REGLAN) 10 MG TABLET    Take 1 tablet by mouth 3 times daily (with meals)    ONDANSETRON (ZOFRAN ODT) 4 MG DISINTEGRATING TABLET    Take 1 tablet by mouth every 6 hours    ONDANSETRON (ZOFRAN) 4 MG TABLET    Take 1 tablet by mouth every 8 hours as needed for Nausea    PROMETHAZINE (PROMETHEGAN) 25 MG SUPPOSITORY    Place 1 suppository rectally every 6 hours as needed for Nausea    RANITIDINE (ZANTAC) 300 MG TABLET    Take 1 tablet by mouth nightly    TRAZODONE (DESYREL) 50 MG TABLET    Take 50 mg by mouth nightly     ALLERGIES  Allergies   Allergen Reactions    Marijuana (Cannabis Sativa) Nausea And Vomiting     Induces severe nausea and vomiting which requires visits to the emergency department       Nursing notes reviewed by myself for past medical history, family history, social history, surgical history, current medications, and allergies. PHYSICAL EXAM  VITAL SIGNS: Triage VS:    ED Triage Vitals [07/20/21 0438]   Enc Vitals Group      BP (!) 156/110      Pulse 94      Resp 18      Temp 98.8 °F (37.1 °C)      Temp Source Oral      SpO2 96 %      Weight 230 lb (104.3 kg)      Height 5' 4\" (1.626 m)      Head Circumference       Peak Flow       Pain Score       Pain Loc       Pain Edu? Excl. in [address]? Constitutional: Well developed, Well nourished, nontoxic appearing  HENT: Normocephalic, Atraumatic, Bilateral external ears normal, tacky mucous membranes, No oral exudates, Nose normal.   Eyes: PERRL, EOMI, Conjunctiva normal, No discharge.  No scleral icterus. Neck: Normal range of motion, No tenderness, Supple. Lymphatic: No lymphadenopathy noted. Cardiovascular: Normal heart rate, Normal rhythm, No murmurs, gallops or rubs. Thorax & Lungs: Normal breath sounds, No respiratory distress, No wheezing. Abdomen: Soft, No tenderness, No masses, No pulsatile masses, No distention, Normal bowel sounds  Skin: Warm, Dry, Pink, No mottling, No erythema, No rash. Back: No tenderness, No CVA tenderness. Extremities: No edema, No tenderness, No cyanosis, Normal perfusion, No clubbing. Musculoskeletal: Good range of motion in all major joints as observed. No major deformities noted. Neurologic: Alert & oriented x 3, Normal motor function, Normal sensory function, CN II-XII grossly intact as tested, No focal deficits noted. Psychiatric: Affect normal, Judgment normal, Mood normal.     RADIOLOGY  Labs Reviewed   COMPREHENSIVE METABOLIC PANEL - Abnormal; Notable for the following components:       Result Value    Sodium 134 (*)     Potassium 3.0 (*)     Chloride 92 (*)     Glucose 114 (*)     Total Bilirubin 1.2 (*)     All other components within normal limits   CBC WITH AUTO DIFFERENTIAL - Abnormal; Notable for the following components:    WBC 11.3 (*)     RBC 5.54 (*)     Hemoglobin 16.5 (*)     Hematocrit 47.5 (*)     Monocytes % 10.7 (*)     All other components within normal limits   URINALYSIS   PREGNANCY, URINE     I personally reviewed the images.  The radiologist's interpretation reveals:  Last Imaging results   No orders to display       MEDS GIVEN IN ED:  Medications   magnesium sulfate 2000 mg in 50 mL IVPB premix (2,000 mg Intravenous New Bag 7/20/21 7225)   potassium chloride 10 mEq/100 mL IVPB (Peripheral Line) (has no administration in time range)   potassium chloride (KLOR-CON M) extended release tablet 40 mEq (has no administration in time range)   haloperidol lactate (HALDOL) injection 5 mg (5 mg Intravenous Given 7/20/21 2882)   famotidine (PEPCID) injection 20 mg (20 mg Intravenous Given 7/20/21 0513)   0.9 % sodium chloride bolus (1,000 mLs Intravenous New Bag 7/20/21 0512)   diphenhydrAMINE (BENADRYL) injection 50 mg (50 mg Intravenous Given 7/20/21 0513)     COURSE & MEDICAL DECISION MAKING  30-year-old female presents the emergency department complaints of intractable nausea and vomiting. She has been evaluated on multiple occasions for this including 2 days ago and was found to be positive for marijuana. Initial vital signs here remarkable for elevated blood pressure. On exam she has tacky mucous membranes. Her abdomen is soft and nontender. At this time I do suspect the patient is experiencing marijuana induced hyperemesis. At this time we will treat her symptomatically with IV fluid bolus, Haldol, Benadryl, and Pepcid. We will also obtain CBC and CMP. Given her reassuring abdominal exam I do not feel that imaging studies are currently indicated. Patient CMP demonstrates hypokalemia with potassium 3.0. Therefore IV magnesium, IV potassium, and oral potassium repletion were ordered for the patient. Patient had significant improvement aforementioned inventions. At this time she will be discharged following electrolyte repletion. Provided with a prescription for Phenergan. Return precautions provided. Appropriate PPE utilized as indicated for entire patient encounter? Time of Disposition: See timeline  ? New Prescriptions    PROMETHAZINE (PHENERGAN) 25 MG TABLET    Take 1 tablet by mouth every 6 hours as needed for Nausea     FINAL IMPRESSION  1. Nausea and vomiting, intractability of vomiting not specified, unspecified vomiting type    2. Hypokalemia    3. Marijuana use        Electronically signed by:  [address], DO, 7/20/2021         [address], DO  07/20/21 4663

## 2021-10-27 ENCOUNTER — OFFICE VISIT (OUTPATIENT)
Dept: OBGYN | Age: 21
End: 2021-10-27
Payer: COMMERCIAL

## 2021-10-27 ENCOUNTER — HOSPITAL ENCOUNTER (OUTPATIENT)
Age: 21
Setting detail: SPECIMEN
Discharge: HOME | End: 2021-10-27
Payer: COMMERCIAL

## 2021-10-27 VITALS
HEIGHT: 64 IN | BODY MASS INDEX: 40.29 KG/M2 | HEART RATE: 80 BPM | DIASTOLIC BLOOD PRESSURE: 88 MMHG | SYSTOLIC BLOOD PRESSURE: 128 MMHG | WEIGHT: 236 LBS

## 2021-10-27 DIAGNOSIS — Z01.419 WOMEN'S ANNUAL ROUTINE GYNECOLOGICAL EXAMINATION: Primary | ICD-10-CM

## 2021-10-27 DIAGNOSIS — E66.01 MORBID OBESITY (HCC): ICD-10-CM

## 2021-10-27 DIAGNOSIS — Z11.3 SCREENING EXAMINATION FOR VENEREAL DISEASE: ICD-10-CM

## 2021-10-27 LAB — HEPATITIS B SURFACE ANTIGEN INTERPRETATION: NORMAL

## 2021-10-27 PROCEDURE — 88142 CYTOPATH C/V THIN LAYER: CPT

## 2021-10-27 PROCEDURE — 99395 PREV VISIT EST AGE 18-39: CPT | Performed by: NURSE PRACTITIONER

## 2021-10-27 PROCEDURE — 36415 COLL VENOUS BLD VENIPUNCTURE: CPT | Performed by: NURSE PRACTITIONER

## 2021-10-27 PROCEDURE — 87801 DETECT AGNT MULT DNA AMPLI: CPT

## 2021-10-27 PROCEDURE — 87624 HPV HI-RISK TYP POOLED RSLT: CPT

## 2021-10-27 SDOH — ECONOMIC STABILITY: FOOD INSECURITY: WITHIN THE PAST 12 MONTHS, YOU WORRIED THAT YOUR FOOD WOULD RUN OUT BEFORE YOU GOT MONEY TO BUY MORE.: NEVER TRUE

## 2021-10-27 SDOH — ECONOMIC STABILITY: FOOD INSECURITY: WITHIN THE PAST 12 MONTHS, THE FOOD YOU BOUGHT JUST DIDN'T LAST AND YOU DIDN'T HAVE MONEY TO GET MORE.: NEVER TRUE

## 2021-10-27 ASSESSMENT — PATIENT HEALTH QUESTIONNAIRE - PHQ9
SUM OF ALL RESPONSES TO PHQ QUESTIONS 1-9: 2
SUM OF ALL RESPONSES TO PHQ9 QUESTIONS 1 & 2: 2
1. LITTLE INTEREST OR PLEASURE IN DOING THINGS: 1
SUM OF ALL RESPONSES TO PHQ QUESTIONS 1-9: 2
SUM OF ALL RESPONSES TO PHQ QUESTIONS 1-9: 2
2. FEELING DOWN, DEPRESSED OR HOPELESS: 1

## 2021-10-27 ASSESSMENT — SOCIAL DETERMINANTS OF HEALTH (SDOH): HOW HARD IS IT FOR YOU TO PAY FOR THE VERY BASICS LIKE FOOD, HOUSING, MEDICAL CARE, AND HEATING?: NOT HARD AT ALL

## 2021-10-27 ASSESSMENT — ENCOUNTER SYMPTOMS
RESPIRATORY NEGATIVE: 1
GASTROINTESTINAL NEGATIVE: 1

## 2021-10-27 NOTE — PROGRESS NOTES
10/27/21    Milton Johnson  2000    Chief Complaint   Patient presents with   Guillory Annual Exam     Annual exam, LMP 10/4/21, never had pap, no c/o requesting std check . ns        The patient is a 24 y.o. female, No obstetric history on file. who presents for her annual exam.  She is menstruating normally. She is  sexually active. She is not currently taking birth control    She reports no gynecological symptoms. Pap smear history: She has not had a PAP smear in the past.    Past Medical History:   Diagnosis Date    ADHD (attention deficit hyperactivity disorder)     Anxiety     Bipolar 1 disorder (HCC)     Depression     Obesity        No past surgical history on file.     Family History   Problem Relation Age of Onset    Asthma Mother        Social History     Tobacco Use    Smoking status: Current Every Day Smoker     Packs/day: 0.50     Types: Cigarettes    Smokeless tobacco: Never Used   Vaping Use    Vaping Use: Never used   Substance Use Topics    Alcohol use: Yes     Comment: rarely    Drug use: Yes     Types: Marijuana       Current Outpatient Medications   Medication Sig Dispense Refill    ondansetron (ZOFRAN ODT) 4 MG disintegrating tablet Take 1 tablet by mouth every 6 hours 10 tablet 0    acetaminophen (APAP EXTRA STRENGTH) 500 MG tablet Take 1 tablet by mouth every 6 hours as needed for Pain 30 tablet 0    ondansetron (ZOFRAN) 4 MG tablet Take 1 tablet by mouth every 8 hours as needed for Nausea (Patient not taking: Reported on 10/27/2021) 10 tablet 0    metoclopramide (REGLAN) 10 MG tablet Take 1 tablet by mouth 3 times daily (with meals) (Patient not taking: Reported on 10/27/2021) 120 tablet 3    Capsaicin 0.035 % LOTN Apply 1 applicator topically 2 times daily (Patient not taking: Reported on 10/27/2021) 2 Bottle 1    dicyclomine (BENTYL) 10 MG capsule Take 2 capsules by mouth 4 times daily (before meals and nightly) (Patient not taking: Reported on 10/27/2021) 30 capsule 0    promethazine (PROMETHEGAN) 25 MG suppository Place 1 suppository rectally every 6 hours as needed for Nausea (Patient not taking: Reported on 10/27/2021) 10 suppository 0    famotidine (PEPCID) 20 MG tablet Take 1 tablet by mouth 2 times daily (Patient not taking: Reported on 10/27/2021) 30 tablet 0    ranitidine (ZANTAC) 300 MG tablet Take 1 tablet by mouth nightly (Patient not taking: Reported on 10/27/2021) 30 tablet 0    hydrOXYzine (VISTARIL) 25 MG capsule Take 1 capsule by mouth three times daily (Patient not taking: Reported on 10/27/2021) 30 capsule 1    traZODone (DESYREL) 50 MG tablet Take 50 mg by mouth nightly (Patient not taking: Reported on 10/27/2021)      lurasidone (LATUDA) 20 MG TABS tablet Take 20 mg by mouth daily (Patient not taking: Reported on 10/27/2021)      divalproex (DEPAKOTE) 500 MG DR tablet Take 500 mg by mouth nightly. (Patient not taking: Reported on 10/27/2021)      ARIPiprazole (ABILIFY) 5 MG tablet Take 10 mg by mouth daily. (Patient not taking: Reported on 10/27/2021)      ibuprofen (IBU) 400 MG tablet Take 1 tablet by mouth every 6 hours as needed for Pain. 15 tablet 0     No current facility-administered medications for this visit. Allergies   Allergen Reactions    Marijuana (Cannabis Sativa) Nausea And Vomiting     Induces severe nausea and vomiting which requires visits to the emergency department       No obstetric history on file. Immunization History   Administered Date(s) Administered    Tdap (Boostrix, Adacel) 01/17/2018       Review of Systems   Constitutional: Negative. Respiratory: Negative. Gastrointestinal: Negative. Genitourinary: Negative. /88   Pulse 80   Ht 5' 4\" (1.626 m)   Wt 236 lb (107 kg)   LMP 10/04/2021   BMI 40.51 kg/m²     Physical Exam  Vitals reviewed. Constitutional:       Appearance: Normal appearance. HENT:      Head: Normocephalic.    Pulmonary:      Effort: Pulmonary effort is normal. Chest:      Breasts:         Right: Normal.         Left: Normal.   Abdominal:      Palpations: Abdomen is soft. Genitourinary:     General: Normal vulva. Vagina: Bleeding present. Cervix: Normal.      Uterus: Normal.       Adnexa: Right adnexa normal and left adnexa normal.      Rectum: Normal.   Musculoskeletal:         General: Normal range of motion. Cervical back: Normal range of motion. Skin:     General: Skin is warm and dry. Neurological:      Mental Status: She is alert. Psychiatric:         Mood and Affect: Mood normal.         Behavior: Behavior normal.         No results found for this visit on 10/27/21. Assessment and Plan   Diagnosis Orders   1. Women's annual routine gynecological examination  PAP SMEAR   2. Morbid obesity ([address]     3. Screening examination for venereal disease  Hepatitis B Surface Antigen    RPR Reflex to Titer and TPPA    Herpes Simplex Virus,I/II,IgG    HIV Screen    C.trachomatis N.gonorrhoeae DNA    Vaginal Pathogens Probes *A    C.trachomatis N.gonorrhoeae DNA, Thin Prep       Return in about 1 year (around 10/27/2022).     ZAID Lala - CNP

## 2021-10-28 LAB
HERPES SIMPLEX VIRUS 1 IGG: NEGATIVE
HERPES SIMPLEX VIRUS 2 IGG: NEGATIVE
HIV AG/AB: NORMAL
HIV ANTIGEN: NORMAL
HIV-1 ANTIBODY: NORMAL
HIV-2 AB: NORMAL

## 2021-11-02 PROBLEM — A54.9 GONORRHEA: Status: ACTIVE | Noted: 2021-11-02

## 2021-11-02 LAB
CHLAMYDIA BY THIN PREP: NEGATIVE
GC BY THIN PREP: POSITIVE
HPV HIGH RISK: NOT DETECTED
HPV, GENOTYPE 16: NOT DETECTED
HPV, GENOTYPE 18: NOT DETECTED

## 2021-11-02 RX ORDER — CEFTRIAXONE 500 MG/1
500 INJECTION, POWDER, FOR SOLUTION INTRAMUSCULAR; INTRAVENOUS ONCE
Qty: 1 EACH | Refills: 0
Start: 2021-11-02 | End: 2021-11-02

## 2021-11-03 ENCOUNTER — NURSE ONLY (OUTPATIENT)
Dept: OBGYN | Age: 21
End: 2021-11-03
Payer: COMMERCIAL

## 2021-11-03 VITALS
BODY MASS INDEX: 39.78 KG/M2 | DIASTOLIC BLOOD PRESSURE: 89 MMHG | WEIGHT: 233 LBS | HEIGHT: 64 IN | SYSTOLIC BLOOD PRESSURE: 136 MMHG

## 2021-11-03 DIAGNOSIS — A54.9 GONORRHEA: Primary | ICD-10-CM

## 2021-11-03 PROCEDURE — 96372 THER/PROPH/DIAG INJ SC/IM: CPT | Performed by: NURSE PRACTITIONER

## 2021-11-03 RX ORDER — CEFTRIAXONE 500 MG/1
500 INJECTION, POWDER, FOR SOLUTION INTRAMUSCULAR; INTRAVENOUS ONCE
Status: COMPLETED | OUTPATIENT
Start: 2021-11-03 | End: 2021-11-03

## 2021-11-03 RX ADMIN — CEFTRIAXONE 500 MG: 500 INJECTION, POWDER, FOR SOLUTION INTRAMUSCULAR; INTRAVENOUS at 09:08

## 2021-12-16 ENCOUNTER — HOSPITAL ENCOUNTER (EMERGENCY)
Age: 21
Discharge: LEFT BEFORE DISCHARGE | End: 2021-12-16

## 2021-12-16 VITALS
HEART RATE: 92 BPM | SYSTOLIC BLOOD PRESSURE: 151 MMHG | WEIGHT: 230 LBS | HEIGHT: 64 IN | BODY MASS INDEX: 39.27 KG/M2 | TEMPERATURE: 98.7 F | OXYGEN SATURATION: 100 % | DIASTOLIC BLOOD PRESSURE: 103 MMHG | RESPIRATION RATE: 18 BRPM

## 2021-12-16 ASSESSMENT — PAIN DESCRIPTION - DESCRIPTORS: DESCRIPTORS: ACHING;POUNDING

## 2021-12-16 ASSESSMENT — PAIN DESCRIPTION - ORIENTATION: ORIENTATION: LEFT

## 2021-12-16 ASSESSMENT — PAIN DESCRIPTION - LOCATION: LOCATION: EAR

## 2021-12-16 ASSESSMENT — PAIN DESCRIPTION - PAIN TYPE: TYPE: ACUTE PAIN

## 2021-12-16 ASSESSMENT — PAIN SCALES - GENERAL: PAINLEVEL_OUTOF10: 7

## 2021-12-16 NOTE — ED TRIAGE NOTES
Pt to ED through triage with c/o fever, headache, pharyngitis, and otalgia. Otalgia x4 days, started draining red/orange drainage yesterday. Has been taking ibuprofen, diphenhydramine, and acetaminophen as needed, nothing since 0800 this morning.

## 2021-12-18 ENCOUNTER — HOSPITAL ENCOUNTER (EMERGENCY)
Age: 21
Discharge: HOME | End: 2021-12-18
Payer: COMMERCIAL

## 2021-12-18 VITALS
TEMPERATURE: 98.2 F | SYSTOLIC BLOOD PRESSURE: 145 MMHG | RESPIRATION RATE: 18 BRPM | HEIGHT: 64 IN | WEIGHT: 225 LBS | BODY MASS INDEX: 38.41 KG/M2 | DIASTOLIC BLOOD PRESSURE: 90 MMHG | OXYGEN SATURATION: 99 % | HEART RATE: 93 BPM

## 2021-12-18 DIAGNOSIS — R09.81 SINUS CONGESTION: ICD-10-CM

## 2021-12-18 DIAGNOSIS — R50.9 FEVER, UNSPECIFIED FEVER CAUSE: ICD-10-CM

## 2021-12-18 DIAGNOSIS — H60.392 INFECTIVE OTITIS EXTERNA OF LEFT EAR: Primary | ICD-10-CM

## 2021-12-18 LAB
SARS-COV-2: NOT DETECTED
SOURCE: NORMAL

## 2021-12-18 PROCEDURE — 99283 EMERGENCY DEPT VISIT LOW MDM: CPT

## 2021-12-18 PROCEDURE — U0003 INFECTIOUS AGENT DETECTION BY NUCLEIC ACID (DNA OR RNA); SEVERE ACUTE RESPIRATORY SYNDROME CORONAVIRUS 2 (SARS-COV-2) (CORONAVIRUS DISEASE [COVID-19]), AMPLIFIED PROBE TECHNIQUE, MAKING USE OF HIGH THROUGHPUT TECHNOLOGIES AS DESCRIBED BY CMS-2020-01-R: HCPCS

## 2021-12-18 PROCEDURE — U0005 INFEC AGEN DETEC AMPLI PROBE: HCPCS

## 2021-12-18 RX ORDER — IBUPROFEN 800 MG/1
800 TABLET ORAL EVERY 8 HOURS PRN
Qty: 20 TABLET | Refills: 0 | Status: SHIPPED | OUTPATIENT
Start: 2021-12-18 | End: 2022-02-17

## 2021-12-18 RX ORDER — CIPROFLOXACIN AND DEXAMETHASONE 3; 1 MG/ML; MG/ML
4 SUSPENSION/ DROPS AURICULAR (OTIC) 2 TIMES DAILY
Qty: 7.5 ML | Refills: 0 | Status: SHIPPED | OUTPATIENT
Start: 2021-12-18 | End: 2021-12-25

## 2021-12-18 ASSESSMENT — PAIN DESCRIPTION - DESCRIPTORS: DESCRIPTORS: ACHING

## 2021-12-18 ASSESSMENT — PAIN DESCRIPTION - FREQUENCY: FREQUENCY: CONTINUOUS

## 2021-12-18 ASSESSMENT — PAIN DESCRIPTION - LOCATION: LOCATION: HEAD;EAR

## 2021-12-18 ASSESSMENT — PAIN SCALES - GENERAL: PAINLEVEL_OUTOF10: 9

## 2021-12-18 NOTE — ED NOTES
Patient given discharge instructions at this time. Verbalizes understanding and denies any further needs, questions, concerns or complaints. Patient ambulatory to front of ER in standing, up position in stable condition.       Luz Marina Cleaning RN  12/18/21 3566

## 2021-12-18 NOTE — ED PROVIDER NOTES
mouth every 6 hours 10 tablet 0    dicyclomine (BENTYL) 10 MG capsule Take 2 capsules by mouth 4 times daily (before meals and nightly) (Patient not taking: Reported on 10/27/2021) 30 capsule 0    promethazine (PROMETHEGAN) 25 MG suppository Place 1 suppository rectally every 6 hours as needed for Nausea (Patient not taking: Reported on 10/27/2021) 10 suppository 0    famotidine (PEPCID) 20 MG tablet Take 1 tablet by mouth 2 times daily (Patient not taking: Reported on 10/27/2021) 30 tablet 0    ranitidine (ZANTAC) 300 MG tablet Take 1 tablet by mouth nightly (Patient not taking: Reported on 10/27/2021) 30 tablet 0    hydrOXYzine (VISTARIL) 25 MG capsule Take 1 capsule by mouth three times daily (Patient not taking: Reported on 10/27/2021) 30 capsule 1    acetaminophen (APAP EXTRA STRENGTH) 500 MG tablet Take 1 tablet by mouth every 6 hours as needed for Pain 30 tablet 0    traZODone (DESYREL) 50 MG tablet Take 50 mg by mouth nightly (Patient not taking: Reported on 10/27/2021)      lurasidone (LATUDA) 20 MG TABS tablet Take 20 mg by mouth daily (Patient not taking: Reported on 10/27/2021)      divalproex (DEPAKOTE) 500 MG DR tablet Take 500 mg by mouth nightly. (Patient not taking: Reported on 10/27/2021)      ARIPiprazole (ABILIFY) 5 MG tablet Take 10 mg by mouth daily.  (Patient not taking: Reported on 10/27/2021)         ALLERGIES    Allergies   Allergen Reactions    Marijuana (Cannabis Sativa) Nausea And Vomiting     Induces severe nausea and vomiting which requires visits to the emergency department       FAMILY HISTORY    Family History   Problem Relation Age of Onset    Asthma Mother        SOCIAL HISTORY    Social History     Socioeconomic History    Marital status: Single     Spouse name: None    Number of children: None    Years of education: None    Highest education level: None   Occupational History    None   Tobacco Use    Smoking status: Current Every Day Smoker     Packs/day: 0.50 Types: Cigarettes    Smokeless tobacco: Never Used   Vaping Use    Vaping Use: Never used   Substance and Sexual Activity    Alcohol use: Yes     Comment: rarely    Drug use: Yes     Types: Marijuana Natalie Ambcharito)    Sexual activity: Never   Other Topics Concern    None   Social History Narrative    None     Social Determinants of Health     Financial Resource Strain: Low Risk     Difficulty of Paying Living Expenses: Not hard at all   Food Insecurity: No Food Insecurity    Worried About Running Out of Food in the Last Year: Never true    David of Food in the Last Year: Never true   Transportation Needs:     Lack of Transportation (Medical): Not on file    Lack of Transportation (Non-Medical): Not on file   Physical Activity:     Days of Exercise per Week: Not on file    Minutes of Exercise per Session: Not on file   Stress:     Feeling of Stress : Not on file   Social Connections:     Frequency of Communication with Friends and Family: Not on file    Frequency of Social Gatherings with Friends and Family: Not on file    Attends Judaism Services: Not on file    Active Member of [address] [place] [address] or Organizations: Not on file    Attends Club or Organization Meetings: Not on file    Marital Status: Not on file   Intimate Partner Violence:     Fear of Current or Ex-Partner: Not on file    Emotionally Abused: Not on file    Physically Abused: Not on file    Sexually Abused: Not on file   Housing Stability:     Unable to Pay for Housing in the Last Year: Not on file    Number of [place] in the Last Year: Not on file    Unstable Housing in the Last Year: Not on file       PHYSICAL EXAM    VITAL SIGNS: BP (!) 145/90   Pulse 93   Temp 98.2 °F (36.8 °C) (Oral)   Resp 18   Ht 5' 4\" (1.626 m)   Wt 225 lb (102.1 kg)   LMP 12/09/2021 (Approximate)   SpO2 99%   BMI 38.62 kg/m²   GENERAL:  Well-developed, well-nourished, no acute distress  EYES:   PERRL, EOMI. Conjunctiva normal.  HENT:  NC/AT. External ears normal.  R TM intact without injection or effusion. L EAC is edematous, no drainage, exam painful. TM intact, slightly erythematous, no effusion. Nares patent. No sinus tenderness to palpation/percussion. Oropharynx moist and pink. No posterior pharyngeal erythema. no tonsillar edema, no exudates. Uvula midline. LUNGS:  Lungs CTAB, no rales or stridor or wheezing. CARDIAC:   RRR. No murmurs. ABDOMEN:  Abdomen soft, non-tender. Bowel sounds active. EXTREMITIES:   No edema. DP intact and symmetric. SKIN:   Warm and dry. NEURO:  Alert and oriented. No focal deficits. LYMPH:  No cervical lymphadenopathy. RADIOLOGY/PROCEDURES        ED COURSE & MEDICAL DECISION MAKING    Pertinent Labs & Imaging studies reviewed. (See chart for details)  -  Patient seen and evaluated in the emergency department. -  Triage and nursing notes reviewed and incorporated. -  Old chart records reviewed and incorporated. -  I evaluated this patient independently  -  Differential diagnosis includes: upper respiratory infection, sinusitis, influenza, pneumonia, mononucleosis, and others  -  Work-up included: COVID testing - pending  -  Results discussed with patient. -  Patient discharged home. Prescriptions for Motrin, Ciprodex provided. Patient to follow-up with PCP. Return to the Emergency Department for new/worsening symptoms as needed. Patient agreeable with plan of care and disposition    FINAL IMPRESSION    1. Infective otitis externa of left ear    2. Fever, unspecified fever cause    3.  Sinus congestion             Orlie Son KEILA Zhang  12/18/21 2057

## 2022-02-17 ENCOUNTER — OFFICE VISIT (OUTPATIENT)
Dept: OBGYN | Age: 22
End: 2022-02-17
Payer: COMMERCIAL

## 2022-02-17 VITALS
HEIGHT: 64 IN | SYSTOLIC BLOOD PRESSURE: 119 MMHG | BODY MASS INDEX: 37.39 KG/M2 | WEIGHT: 219 LBS | DIASTOLIC BLOOD PRESSURE: 79 MMHG

## 2022-02-17 DIAGNOSIS — Z86.19 HISTORY OF GONORRHEA: ICD-10-CM

## 2022-02-17 DIAGNOSIS — Z11.3 SCREENING EXAMINATION FOR VENEREAL DISEASE: ICD-10-CM

## 2022-02-17 DIAGNOSIS — Z87.42 HISTORY OF IRREGULAR MENSTRUAL CYCLES: Primary | ICD-10-CM

## 2022-02-17 LAB
CONTROL: ABNORMAL
HEPATITIS B SURFACE ANTIGEN INTERPRETATION: NORMAL
PREGNANCY TEST URINE, POC: ABNORMAL

## 2022-02-17 PROCEDURE — G8484 FLU IMMUNIZE NO ADMIN: HCPCS | Performed by: NURSE PRACTITIONER

## 2022-02-17 PROCEDURE — 81025 URINE PREGNANCY TEST: CPT | Performed by: NURSE PRACTITIONER

## 2022-02-17 PROCEDURE — 99213 OFFICE O/P EST LOW 20 MIN: CPT | Performed by: NURSE PRACTITIONER

## 2022-02-17 PROCEDURE — G8427 DOCREV CUR MEDS BY ELIG CLIN: HCPCS | Performed by: NURSE PRACTITIONER

## 2022-02-17 PROCEDURE — 4004F PT TOBACCO SCREEN RCVD TLK: CPT | Performed by: NURSE PRACTITIONER

## 2022-02-17 PROCEDURE — G8417 CALC BMI ABV UP PARAM F/U: HCPCS | Performed by: NURSE PRACTITIONER

## 2022-02-17 ASSESSMENT — ENCOUNTER SYMPTOMS
RESPIRATORY NEGATIVE: 1
GASTROINTESTINAL NEGATIVE: 1

## 2022-02-17 NOTE — PROGRESS NOTES
2/17/22    Aurora Helms  2000    Chief Complaint   Patient presents with    Exposure to STD     pt no showed patricia, positive gonerrhea 10/27/21. pt rquesting std check, possible exposure x 1 month ago. Aurora Helms is a 25 y.o. female who presents today for evaluation of STD check    Past Medical History:   Diagnosis Date    ADHD (attention deficit hyperactivity disorder)     Anxiety     Bipolar 1 disorder ([facility] [address]     Depression     Exposure to STD     Gonorrhea     History of irregular menstrual bleeding     Obesity     Vaginal itching     Vitamin D deficiency        History reviewed. No pertinent surgical history.     Social History     Tobacco Use    Smoking status: Current Every Day Smoker     Packs/day: 0.50     Types: Cigarettes     Start date: 2/17/2014    Smokeless tobacco: Never Used   Vaping Use    Vaping Use: Never used   Substance Use Topics    Alcohol use: Yes     Comment: rarely    Drug use: Yes     Types: Marijuana Olene Clair)       Family History   Problem Relation Age of Onset    Asthma Mother        Current Outpatient Medications   Medication Sig Dispense Refill    ondansetron (ZOFRAN) 4 MG tablet Take 1 tablet by mouth every 8 hours as needed for Nausea (Patient not taking: Reported on 10/27/2021) 10 tablet 0    metoclopramide (REGLAN) 10 MG tablet Take 1 tablet by mouth 3 times daily (with meals) (Patient not taking: Reported on 10/27/2021) 120 tablet 3    Capsaicin 0.035 % LOTN Apply 1 applicator topically 2 times daily (Patient not taking: Reported on 10/27/2021) 2 Bottle 1    ondansetron (ZOFRAN ODT) 4 MG disintegrating tablet Take 1 tablet by mouth every 6 hours (Patient not taking: Reported on 2/17/2022) 10 tablet 0    dicyclomine (BENTYL) 10 MG capsule Take 2 capsules by mouth 4 times daily (before meals and nightly) (Patient not taking: Reported on 10/27/2021) 30 capsule 0    ranitidine (ZANTAC) 300 MG tablet Take 1 tablet by mouth nightly (Patient not taking: Reported on 10/27/2021) 30 tablet 0    hydrOXYzine (VISTARIL) 25 MG capsule Take 1 capsule by mouth three times daily (Patient not taking: Reported on 10/27/2021) 30 capsule 1    acetaminophen (APAP EXTRA STRENGTH) 500 MG tablet Take 1 tablet by mouth every 6 hours as needed for Pain (Patient not taking: Reported on 2/17/2022) 30 tablet 0    traZODone (DESYREL) 50 MG tablet Take 50 mg by mouth nightly (Patient not taking: Reported on 10/27/2021)      ARIPiprazole (ABILIFY) 5 MG tablet Take 10 mg by mouth daily. (Patient not taking: Reported on 10/27/2021)       No current facility-administered medications for this visit. No Known Allergies    No obstetric history on file. Immunization History   Administered Date(s) Administered    Tdap (Boostrix, Adacel) 01/17/2018       Review of Systems   Constitutional: Negative. Respiratory: Negative. Gastrointestinal: Negative. Genitourinary: Negative. /79   Ht 5' 4\" (1.626 m)   Wt 219 lb (99.3 kg)   LMP 01/28/2022   BMI 37.59 kg/m²     Physical Exam  Vitals and nursing note reviewed. Constitutional:       Appearance: Normal appearance. She is normal weight. HENT:      Head: Normocephalic. Pulmonary:      Effort: Pulmonary effort is normal.   Abdominal:      Palpations: Abdomen is soft. Genitourinary:     General: Normal vulva. Vagina: Normal.      Cervix: Normal.      Uterus: Normal.       Adnexa: Right adnexa normal and left adnexa normal.      Rectum: Normal.   Musculoskeletal:         General: Normal range of motion. Cervical back: Normal range of motion. Skin:     General: Skin is warm and dry. Neurological:      Mental Status: She is alert.    Psychiatric:         Attention and Perception: Attention normal.         Mood and Affect: Mood normal.         Results for orders placed or performed in visit on 02/17/22   POCT urine pregnancy   Result Value Ref Range    Preg Test, Ur neg     Control ASSESSMENT AND PLAN   Diagnosis Orders   1. History of irregular menstrual cycles  POCT urine pregnancy   2. Screening examination for venereal disease  Hepatitis B Surface Antigen    RPR Reflex to Titer and TPPA    Herpes Simplex Virus,I/II,IgG    HIV Screen    Vaginal Pathogens Probes *A    C.trachomatis N.gonorrhoeae DNA   3. History of gonorrhea         Return if symptoms worsen or fail to improve.     Betty Weinberg, APRN - CNP

## 2022-02-18 LAB
CANDIDA SPECIES, DNA PROBE: NORMAL
GARDNERELLA VAGINALIS, DNA PROBE: NORMAL
HERPES SIMPLEX VIRUS 1 IGG: NEGATIVE
HERPES SIMPLEX VIRUS 2 IGG: NEGATIVE
HIV AG/AB: NORMAL
HIV ANTIGEN: NORMAL
HIV-1 ANTIBODY: NORMAL
HIV-2 AB: NORMAL
TRICHOMONAS VAGINALIS DNA: NORMAL

## 2022-02-19 LAB
C TRACH DNA GENITAL QL NAA+PROBE: NEGATIVE
N. GONORRHOEAE DNA: NEGATIVE
RPR: NORMAL

## 2022-03-11 ENCOUNTER — HOSPITAL ENCOUNTER (EMERGENCY)
Age: 22
Discharge: HOME | End: 2022-03-11
Payer: COMMERCIAL

## 2022-03-11 VITALS
SYSTOLIC BLOOD PRESSURE: 119 MMHG | RESPIRATION RATE: 27 BRPM | WEIGHT: 220 LBS | HEIGHT: 64 IN | HEART RATE: 84 BPM | TEMPERATURE: 97.6 F | DIASTOLIC BLOOD PRESSURE: 75 MMHG | OXYGEN SATURATION: 94 % | BODY MASS INDEX: 37.56 KG/M2

## 2022-03-11 DIAGNOSIS — R11.2 NAUSEA VOMITING AND DIARRHEA: Primary | ICD-10-CM

## 2022-03-11 DIAGNOSIS — R19.7 NAUSEA VOMITING AND DIARRHEA: Primary | ICD-10-CM

## 2022-03-11 LAB
ALBUMIN SERPL-MCNC: 4.4 GM/DL (ref 3.4–5)
ALP BLD-CCNC: 61 IU/L (ref 40–129)
ALT SERPL-CCNC: 19 U/L (ref 10–40)
ANION GAP SERPL CALCULATED.3IONS-SCNC: 19 MMOL/L (ref 4–16)
AST SERPL-CCNC: 15 IU/L (ref 15–37)
BASOPHILS ABSOLUTE: 0 K/CU MM
BASOPHILS RELATIVE PERCENT: 0.1 % (ref 0–1)
BILIRUB SERPL-MCNC: 0.4 MG/DL (ref 0–1)
BUN BLDV-MCNC: 7 MG/DL (ref 6–23)
CALCIUM SERPL-MCNC: 10 MG/DL (ref 8.3–10.6)
CHLORIDE BLD-SCNC: 101 MMOL/L (ref 99–110)
CO2: 20 MMOL/L (ref 21–32)
CREAT SERPL-MCNC: 0.5 MG/DL (ref 0.6–1.1)
DIFFERENTIAL TYPE: ABNORMAL
EOSINOPHILS ABSOLUTE: 0 K/CU MM
EOSINOPHILS RELATIVE PERCENT: 0 % (ref 0–3)
GFR AFRICAN AMERICAN: >60 ML/MIN/1.73M2
GFR NON-AFRICAN AMERICAN: >60 ML/MIN/1.73M2
GLUCOSE BLD-MCNC: 142 MG/DL (ref 70–99)
HCG QUALITATIVE: NEGATIVE
HCT VFR BLD CALC: 42.2 % (ref 37–47)
HEMOGLOBIN: 14.2 GM/DL (ref 12.5–16)
IMMATURE NEUTROPHIL %: 0.4 % (ref 0–0.43)
LIPASE: 13 IU/L (ref 13–60)
LYMPHOCYTES ABSOLUTE: 0.9 K/CU MM
LYMPHOCYTES RELATIVE PERCENT: 7 % (ref 24–44)
MCH RBC QN AUTO: 28.7 PG (ref 27–31)
MCHC RBC AUTO-ENTMCNC: 33.6 % (ref 32–36)
MCV RBC AUTO: 85.4 FL (ref 78–100)
MONOCYTES ABSOLUTE: 0.2 K/CU MM
MONOCYTES RELATIVE PERCENT: 1.6 % (ref 0–4)
NUCLEATED RBC %: 0 %
PDW BLD-RTO: 14.5 % (ref 11.7–14.9)
PLATELET # BLD: 403 K/CU MM (ref 140–440)
PMV BLD AUTO: 9.6 FL (ref 7.5–11.1)
POTASSIUM SERPL-SCNC: 3.6 MMOL/L (ref 3.5–5.1)
RBC # BLD: 4.94 M/CU MM (ref 4.2–5.4)
SEGMENTED NEUTROPHILS ABSOLUTE COUNT: 12.2 K/CU MM
SEGMENTED NEUTROPHILS RELATIVE PERCENT: 90.9 % (ref 36–66)
SODIUM BLD-SCNC: 140 MMOL/L (ref 135–145)
TOTAL IMMATURE NEUTOROPHIL: 0.06 K/CU MM
TOTAL NUCLEATED RBC: 0 K/CU MM
TOTAL PROTEIN: 8 GM/DL (ref 6.4–8.2)
WBC # BLD: 13.5 K/CU MM (ref 4–10.5)

## 2022-03-11 PROCEDURE — 6360000002 HC RX W HCPCS: Performed by: NURSE PRACTITIONER

## 2022-03-11 PROCEDURE — 2580000003 HC RX 258: Performed by: NURSE PRACTITIONER

## 2022-03-11 PROCEDURE — 84703 CHORIONIC GONADOTROPIN ASSAY: CPT

## 2022-03-11 PROCEDURE — 99285 EMERGENCY DEPT VISIT HI MDM: CPT

## 2022-03-11 PROCEDURE — 96375 TX/PRO/DX INJ NEW DRUG ADDON: CPT

## 2022-03-11 PROCEDURE — 83690 ASSAY OF LIPASE: CPT

## 2022-03-11 PROCEDURE — 85025 COMPLETE CBC W/AUTO DIFF WBC: CPT

## 2022-03-11 PROCEDURE — 80053 COMPREHEN METABOLIC PANEL: CPT

## 2022-03-11 PROCEDURE — 96374 THER/PROPH/DIAG INJ IV PUSH: CPT

## 2022-03-11 RX ORDER — 0.9 % SODIUM CHLORIDE 0.9 %
1000 INTRAVENOUS SOLUTION INTRAVENOUS ONCE
Status: COMPLETED | OUTPATIENT
Start: 2022-03-11 | End: 2022-03-11

## 2022-03-11 RX ORDER — DIPHENHYDRAMINE HYDROCHLORIDE 50 MG/ML
25 INJECTION INTRAMUSCULAR; INTRAVENOUS ONCE
Status: COMPLETED | OUTPATIENT
Start: 2022-03-11 | End: 2022-03-11

## 2022-03-11 RX ORDER — PROMETHAZINE HYDROCHLORIDE 25 MG/1
25 TABLET ORAL EVERY 6 HOURS PRN
Qty: 15 TABLET | Refills: 0 | Status: SHIPPED | OUTPATIENT
Start: 2022-03-11 | End: 2022-05-24

## 2022-03-11 RX ORDER — PROMETHAZINE HYDROCHLORIDE 25 MG/ML
50 INJECTION, SOLUTION INTRAMUSCULAR; INTRAVENOUS ONCE
Status: DISCONTINUED | OUTPATIENT
Start: 2022-03-11 | End: 2022-03-11

## 2022-03-11 RX ORDER — METOCLOPRAMIDE HYDROCHLORIDE 5 MG/ML
10 INJECTION INTRAMUSCULAR; INTRAVENOUS ONCE
Status: COMPLETED | OUTPATIENT
Start: 2022-03-11 | End: 2022-03-11

## 2022-03-11 RX ADMIN — METOCLOPRAMIDE 10 MG: 5 INJECTION, SOLUTION INTRAMUSCULAR; INTRAVENOUS at 13:00

## 2022-03-11 RX ADMIN — DIPHENHYDRAMINE HYDROCHLORIDE 25 MG: 50 INJECTION INTRAMUSCULAR; INTRAVENOUS at 13:00

## 2022-03-11 RX ADMIN — SODIUM CHLORIDE 1000 ML: 9 INJECTION, SOLUTION INTRAVENOUS at 18:35

## 2022-03-11 ASSESSMENT — PAIN SCALES - WONG BAKER: WONGBAKER_NUMERICALRESPONSE: 0

## 2022-03-11 ASSESSMENT — PAIN SCALES - GENERAL
PAINLEVEL_OUTOF10: 0
PAINLEVEL_OUTOF10: 0

## 2022-03-11 NOTE — ED PROVIDER NOTES
Patient independently evaluated  6:57 PM EST  Jemal Leung was checked out to me by Yanely Hernandez. Please see her initial documentation for details of the patient's ED presentation, physical exam and completed studies. At time of patient signout, medications and p.o. fluid challenge pending. In brief, Jemal Leung presented for nausea vomiting and diarrhea since this morning. Patient does smoke marijuana. Patient denies significant abdominal pain. Patient denies pregnancy. Patient states she did eat some chicken last night which may have caused her symptoms. Patient does smoke marijuana.     I have reviewed and interpreted all of the currently available lab/imaging results from this visit (if applicable):  LABS:  Results for orders placed or performed during the hospital encounter of 03/11/22   CBC with Auto Differential   Result Value Ref Range    WBC 13.5 (H) 4.0 - 10.5 K/CU MM    RBC 4.94 4.2 - 5.4 M/CU MM    Hemoglobin 14.2 12.5 - 16.0 GM/DL    Hematocrit 42.2 37 - 47 %    MCV 85.4 78 - 100 FL    MCH 28.7 27 - 31 PG    MCHC 33.6 32.0 - 36.0 %    RDW 14.5 11.7 - 14.9 %    Platelets 987 701 - 062 K/CU MM    MPV 9.6 7.5 - 11.1 FL    Differential Type AUTOMATED DIFFERENTIAL     Segs Relative 90.9 (H) 36 - 66 %    Lymphocytes % 7.0 (L) 24 - 44 %    Monocytes % 1.6 0 - 4 %    Eosinophils % 0.0 0 - 3 %    Basophils % 0.1 0 - 1 %    Segs Absolute 12.2 K/CU MM    Lymphocytes Absolute 0.9 K/CU MM    Monocytes Absolute 0.2 K/CU MM    Eosinophils Absolute 0.0 K/CU MM    Basophils Absolute 0.0 K/CU MM    Nucleated RBC % 0.0 %    Total Nucleated RBC 0.0 K/CU MM    Total Immature Neutrophil 0.06 K/CU MM    Immature Neutrophil % 0.4 0 - 0.43 %   Comprehensive Metabolic Panel   Result Value Ref Range    Sodium 140 135 - 145 MMOL/L    Potassium 3.6 3.5 - 5.1 MMOL/L    Chloride 101 99 - 110 mMol/L    CO2 20 (L) 21 - 32 MMOL/L    BUN 7 6 - 23 MG/DL    CREATININE 0.5 (L) 0.6 - 1.1 MG/DL    Glucose 142 (H) 70 - 99 MG/DL    Calcium 10.0 8.3 - 10.6 MG/DL    Albumin 4.4 3.4 - 5.0 GM/DL    Total Protein 8.0 6.4 - 8.2 GM/DL    Total Bilirubin 0.4 0.0 - 1.0 MG/DL    ALT 19 10 - 40 U/L    AST 15 15 - 37 IU/L    Alkaline Phosphatase 61 40 - 129 IU/L    GFR Non-African American >60 >60 mL/min/1.73m2    GFR African American >60 >60 mL/min/1.73m2    Anion Gap 19 (H) 4 - 16   HCG Serum, Qualitative   Result Value Ref Range    hCG Qual NEGATIVE    Lipase   Result Value Ref Range    Lipase 13 13 - 60 IU/L         MDM:  Patient signed out to me by Oneal Cabezas, see her note for initial details. Patient denies any urinary symptoms. CBC shows white blood cell count of 13.5. CMP shows CO2 20, anion gap of 19, glucose 142. I do not believe patient is diabetic ketoacidosis and suspect this is related to her vomiting. Pregnancy negative. Lipase 13. Patient provided IV fluids, Benadryl and Reglan. On my evaluation patient states she is feeling much better, tolerating p.o. fluids. Abdomen soft, nontender. Patient states he is feeling much better and ready to go home. I recommend the patient stop smoking marijuana. Recommend gradual increase in diet as tolerated. Patient provided prescription for Phenergan. I recommend follow-up with primary care provider in 2 days for recheck. I discussed with patient the importance of returning the emergency department immediately if new or worsening symptoms develop. Final Impression:  1.  Nausea vomiting and diarrhea        (Please note that portions of this note may have been completed with a voice recognition program. Efforts were made to edit the dictations but occasionally words are mis-transcribed.)    KEILA Dumas PA-C  03/11/22 2030

## 2022-03-11 NOTE — ED PROVIDER NOTES
EMERGENCY DEPARTMENT ENCOUNTER      PCP: No primary care provider on file. CHIEF COMPLAINT    Chief Complaint   Patient presents with    Emesis     since 2am, unable to keep anything down        This patient was not evaluated by the attending physician. I have independently evaluated this patient. My supervising physician was available for consultation. HPI    Minoo Leyva is a 25 y.o. female who presents with nausea, vomiting, and diarrhea. Onset - 2am today. Ate dinner last night at a restaurant but no one else got sick that she went with. Multiple episodes of diarrhea and nonbloody emesis. Aggravating factors are trying to eat/drink. Alleviating factors are denied. Tried zofran without improvement. Patient denies abdominal pain, fever, chills, urinary symptoms, constipation, melena, hematochezia, hematemesis. REVIEW OF SYSTEMS    Constitutional:  Denies fever, chills  HENT:  Denies sore throat or ear pain   Cardiovascular:  Denies chest pain, palpitations or swelling   Respiratory:  Denies cough or shortness of breath   GI:  See HPI above  : No hematuria, urinary urgency, urinary frequency, dysuria. No vaginal symptoms. Musculoskeletal:  Denies back pain or groin pain or masses. No pain or swelling of extremities. Skin:  Denies rash  Neurologic:  Denies headache, focal weakness or sensory changes   Endocrine:  Denies polyuria or polydypsia   Lymphatic:  Denies swollen glands     All other review of systems are negative  See HPI and nursing notes for additional information     PAST MEDICAL & SURGICAL HISTORY    Past Medical History:   Diagnosis Date    ADHD (attention deficit hyperactivity disorder)     Anxiety     Bipolar 1 disorder ([facility] [address]     Depression     Exposure to STD     Gonorrhea     History of irregular menstrual bleeding     Obesity     Vaginal itching     Vitamin D deficiency      History reviewed. No pertinent surgical history.     CURRENT MEDICATIONS    Current Outpatient Rx   Medication Sig Dispense Refill    ondansetron (ZOFRAN) 4 MG tablet Take 1 tablet by mouth every 8 hours as needed for Nausea (Patient not taking: Reported on 10/27/2021) 10 tablet 0    metoclopramide (REGLAN) 10 MG tablet Take 1 tablet by mouth 3 times daily (with meals) (Patient not taking: Reported on 10/27/2021) 120 tablet 3    Capsaicin 0.035 % LOTN Apply 1 applicator topically 2 times daily (Patient not taking: Reported on 10/27/2021) 2 Bottle 1    ondansetron (ZOFRAN ODT) 4 MG disintegrating tablet Take 1 tablet by mouth every 6 hours (Patient not taking: Reported on 2/17/2022) 10 tablet 0    dicyclomine (BENTYL) 10 MG capsule Take 2 capsules by mouth 4 times daily (before meals and nightly) (Patient not taking: Reported on 10/27/2021) 30 capsule 0    ranitidine (ZANTAC) 300 MG tablet Take 1 tablet by mouth nightly (Patient not taking: Reported on 10/27/2021) 30 tablet 0    hydrOXYzine (VISTARIL) 25 MG capsule Take 1 capsule by mouth three times daily (Patient not taking: Reported on 10/27/2021) 30 capsule 1    acetaminophen (APAP EXTRA STRENGTH) 500 MG tablet Take 1 tablet by mouth every 6 hours as needed for Pain (Patient not taking: Reported on 2/17/2022) 30 tablet 0    traZODone (DESYREL) 50 MG tablet Take 50 mg by mouth nightly (Patient not taking: Reported on 10/27/2021)      ARIPiprazole (ABILIFY) 5 MG tablet Take 10 mg by mouth daily.  (Patient not taking: Reported on 10/27/2021)         ALLERGIES    No Known Allergies    SOCIAL AND FAMILY HISTORY    Social History     Socioeconomic History    Marital status: Single     Spouse name: None    Number of children: None    Years of education: None    Highest education level: None   Occupational History    None   Tobacco Use    Smoking status: Current Every Day Smoker     Packs/day: 0.50     Types: Cigarettes     Start date: 2/17/2014    Smokeless tobacco: Never Used   Vaping Use    Vaping Use: Never used   Substance and Sexual Activity    Alcohol use: Yes     Comment: rarely    Drug use: Yes     Types: Marijuana Víctor Free)    Sexual activity: Never   Other Topics Concern    None   Social History Narrative    None     Social Determinants of Health     Financial Resource Strain: Low Risk     Difficulty of Paying Living Expenses: Not hard at all   Food Insecurity: No Food Insecurity    Worried About Running Out of Food in the Last Year: Never true    [address] in the Last Year: Never true   Transportation Needs:     Lack of Transportation (Medical): Not on file    Lack of Transportation (Non-Medical): Not on file   Physical Activity:     Days of Exercise per Week: Not on file    Minutes of Exercise per Session: Not on file   Stress:     Feeling of Stress : Not on file   Social Connections:     Frequency of Communication with Friends and Family: Not on file    Frequency of Social Gatherings with Friends and Family: Not on file    Attends Buddhist Services: Not on file    Active Member of [address] [place] [address] or Organizations: Not on file    Attends Club or Organization Meetings: Not on file    Marital Status: Not on file   Intimate Partner Violence:     Fear of Current or Ex-Partner: Not on file    Emotionally Abused: Not on file    Physically Abused: Not on file    Sexually Abused: Not on file   Housing Stability:     Unable to Pay for Housing in the Last Year: Not on file    Number of [place] in the Last Year: Not on file    Unstable Housing in the Last Year: Not on file     Family History   Problem Relation Age of Onset    Asthma Mother        PHYSICAL EXAM    VITAL SIGNS: BP (!) 153/98   Pulse 86   Temp 97.6 °F (36.4 °C) (Oral)   Resp 22   Ht 5' 4\" (1.626 m)   Wt 220 lb (99.8 kg)   SpO2 100%   BMI 37.76 kg/m²   Constitutional:  Well developed, well nourished. No distress   Eyes:  Sclera nonicteric, conjunctiva moist  HENT:  Atraumatic. EOMI. Mildly tacky mucus membranes.   Neck: supple, no JVD  Respiratory:  No retractions, no accessory muscle use, normal breath sounds   Cardiovascular:  normal rate, normal rhythm, no murmurs    GI:    Actively vomiting. No gross discoloration.       -no Fox's sign (periumbilical ecchymosis)       -no Grey-Davalos's sign (flank ecchymosis)    Bowel sounds present, no audible bruits. Soft, no distention, no guarding, no rigidity,   No abdominal tenderness, no rebound tenderness, no palpable pulsatile masses,   No McBurney's point tenderness   Negative Rovsing sign    Negative Giles's sign. Back:  No CVA tenderness to percussion.   Musculoskeletal:  No edema, no deformity  Vascular: DP pulses 2+ equal bilaterally  Integument: No rash, dry skin  Neurologic:  Alert & oriented, normal speech  Psychiatric: Cooperative, pleasant affect       LABS:  Results for orders placed or performed during the hospital encounter of 03/11/22   CBC with Auto Differential   Result Value Ref Range    WBC 13.5 (H) 4.0 - 10.5 K/CU MM    RBC 4.94 4.2 - 5.4 M/CU MM    Hemoglobin 14.2 12.5 - 16.0 GM/DL    Hematocrit 42.2 37 - 47 %    MCV 85.4 78 - 100 FL    MCH 28.7 27 - 31 PG    MCHC 33.6 32.0 - 36.0 %    RDW 14.5 11.7 - 14.9 %    Platelets 162 915 - 917 K/CU MM    MPV 9.6 7.5 - 11.1 FL    Differential Type AUTOMATED DIFFERENTIAL     Segs Relative 90.9 (H) 36 - 66 %    Lymphocytes % 7.0 (L) 24 - 44 %    Monocytes % 1.6 0 - 4 %    Eosinophils % 0.0 0 - 3 %    Basophils % 0.1 0 - 1 %    Segs Absolute 12.2 K/CU MM    Lymphocytes Absolute 0.9 K/CU MM    Monocytes Absolute 0.2 K/CU MM    Eosinophils Absolute 0.0 K/CU MM    Basophils Absolute 0.0 K/CU MM    Nucleated RBC % 0.0 %    Total Nucleated RBC 0.0 K/CU MM    Total Immature Neutrophil 0.06 K/CU MM    Immature Neutrophil % 0.4 0 - 0.43 %   Comprehensive Metabolic Panel   Result Value Ref Range    Sodium 140 135 - 145 MMOL/L    Potassium 3.6 3.5 - 5.1 MMOL/L    Chloride 101 99 - 110 mMol/L    CO2 20 (L) 21 - 32 MMOL/L    BUN 7 6 - 23 MG/DL CREATININE 0.5 (L) 0.6 - 1.1 MG/DL    Glucose 142 (H) 70 - 99 MG/DL    Calcium 10.0 8.3 - 10.6 MG/DL    Albumin 4.4 3.4 - 5.0 GM/DL    Total Protein 8.0 6.4 - 8.2 GM/DL    Total Bilirubin 0.4 0.0 - 1.0 MG/DL    ALT 19 10 - 40 U/L    AST 15 15 - 37 IU/L    Alkaline Phosphatase 61 40 - 129 IU/L    GFR Non-African American >60 >60 mL/min/1.73m2    GFR African American >60 >60 mL/min/1.73m2    Anion Gap 19 (H) 4 - 16   HCG Serum, Qualitative   Result Value Ref Range    hCG Qual NEGATIVE    Lipase   Result Value Ref Range    Lipase 13 13 - 60 IU/L           RADIOLOGY/PROCEDURES    No orders to display          ED COURSE & MEDICAL DECISION MAKING       Vital signs and nursing notes reviewed during ED course. I have independently evaluated this patient. Supervising physician present in the Emergency Department, available for consultation, throughout entirety of  patient care. Patient presents as above which prompted workup. While in the ED today, labs were obtained. No marked leukocytosis or leukopenia. CMP with mild anion gap and decreased bicarb likely from GI losses. No REJI. Treated with IV reglan/benedyrl and IV fluids in the ED with continued emesis. IM phenergan ordered. Awaiting IM phenergan and PO challenge. Patient does admit to smoking marijuana and having similar bouts of these symptoms in the past. Discussed concern for cannaboid hyperemesis syndrome and advised discontinuing marijuana usage. Lipase within normal limits- low clinical suspicion for pancreatitis. HCG is negative- not pregnant. Abdominal exam without peritoneal signs. All pertinent Lab data and radiographic results reviewed with patient at bedside. The patient and/or the family were informed of the results of any tests/labs/imaging, the treatment plan, and time was allotted to answer questions. 7:00 PM EST I have signed out [address] Emergency Department care to [facility], [place].  We discussed the history, physical exam, completed/pending test results (if obtained) and current treatment plan. Please refer to his chart for the patients further details, remaining Emergency Department course, final disposition and clinical impressions. Clinical  IMPRESSION    1. Nausea vomiting and diarrhea            Comment: Please note this report has been produced using speech recognition software and may contain errors related to that system including errors in grammar, punctuation, and spelling, as well as words and phrases that may be inappropriate. If there are any questions or concerns please feel free to contact the dictating provider for clarification.         Elena Jha PA-C  03/11/22 0786

## 2022-03-11 NOTE — ED PROVIDER NOTES
As provider-in-triage, I performed a medical screening history and physical exam on this patient. HISTORY OF PRESENT ILLNESS  Carmencita Zhu is a 25 y.o. female presents with c/o nausea, vomiting, and diarrhea that began suddenly at 0200 am. She states her symptoms are severe and she is unable to keep anything down. She denies pain or fevers. Nothing has alleviated her symptoms, she has taken two zofran with no relief. Nothing exacerbates her symptoms. PHYSICAL EXAM  BP (!) 153/98   Pulse 86   Temp 97.6 °F (36.4 °C) (Oral)   Resp 22   Ht 5' 4\" (1.626 m)   Wt 220 lb (99.8 kg)   SpO2 100%   BMI 37.76 kg/m²     On exam, the patient appears in no acute distress. Speech is clear. Breathing is unlabored. Moves all extremities    Comment: Please note this report has been produced using speech recognition software and may contain errors related to that system including errors in grammar, punctuation, and spelling, as well as words and phrases that may be inappropriate. If there are any questions or concerns please feel free to contact the dictating provider for clarification.       Sanjay Ramirez, ZAID - CNP  03/11/22 [id number]

## 2022-03-12 ENCOUNTER — APPOINTMENT (OUTPATIENT)
Dept: CT IMAGING | Age: 22
End: 2022-03-12
Payer: COMMERCIAL

## 2022-03-12 ENCOUNTER — HOSPITAL ENCOUNTER (EMERGENCY)
Age: 22
Discharge: HOME | End: 2022-03-12
Payer: COMMERCIAL

## 2022-03-12 VITALS
WEIGHT: 220 LBS | TEMPERATURE: 99.1 F | HEART RATE: 76 BPM | SYSTOLIC BLOOD PRESSURE: 152 MMHG | BODY MASS INDEX: 37.56 KG/M2 | RESPIRATION RATE: 19 BRPM | OXYGEN SATURATION: 100 % | HEIGHT: 64 IN | DIASTOLIC BLOOD PRESSURE: 81 MMHG

## 2022-03-12 DIAGNOSIS — R19.7 DIARRHEA, UNSPECIFIED TYPE: ICD-10-CM

## 2022-03-12 DIAGNOSIS — R11.2 INTRACTABLE VOMITING WITH NAUSEA, UNSPECIFIED VOMITING TYPE: Primary | ICD-10-CM

## 2022-03-12 LAB
ALBUMIN SERPL-MCNC: 4.1 GM/DL (ref 3.4–5)
ALP BLD-CCNC: 54 IU/L (ref 40–129)
ALT SERPL-CCNC: 16 U/L (ref 10–40)
ANION GAP SERPL CALCULATED.3IONS-SCNC: 14 MMOL/L (ref 4–16)
AST SERPL-CCNC: 17 IU/L (ref 15–37)
BACTERIA: ABNORMAL /HPF
BASOPHILS ABSOLUTE: 0 K/CU MM
BASOPHILS RELATIVE PERCENT: 0.1 % (ref 0–1)
BILIRUB SERPL-MCNC: 0.4 MG/DL (ref 0–1)
BILIRUBIN URINE: NEGATIVE MG/DL
BLOOD, URINE: ABNORMAL
BUN BLDV-MCNC: 14 MG/DL (ref 6–23)
CALCIUM SERPL-MCNC: 9.4 MG/DL (ref 8.3–10.6)
CHLORIDE BLD-SCNC: 102 MMOL/L (ref 99–110)
CLARITY: CLEAR
CO2: 24 MMOL/L (ref 21–32)
COLOR: ABNORMAL
CREAT SERPL-MCNC: 0.8 MG/DL (ref 0.6–1.1)
DIFFERENTIAL TYPE: ABNORMAL
EOSINOPHILS ABSOLUTE: 0.1 K/CU MM
EOSINOPHILS RELATIVE PERCENT: 0.4 % (ref 0–3)
GFR AFRICAN AMERICAN: >60 ML/MIN/1.73M2
GFR NON-AFRICAN AMERICAN: >60 ML/MIN/1.73M2
GLUCOSE BLD-MCNC: 121 MG/DL (ref 70–99)
GLUCOSE, URINE: NEGATIVE MG/DL
HCG QUALITATIVE: NEGATIVE
HCT VFR BLD CALC: 42.9 % (ref 37–47)
HEMOGLOBIN: 13.9 GM/DL (ref 12.5–16)
IMMATURE NEUTROPHIL %: 0.5 % (ref 0–0.43)
KETONES, URINE: 40 MG/DL
LEUKOCYTE ESTERASE, URINE: ABNORMAL
LIPASE: 21 IU/L (ref 13–60)
LYMPHOCYTES ABSOLUTE: 2.5 K/CU MM
LYMPHOCYTES RELATIVE PERCENT: 18.2 % (ref 24–44)
MCH RBC QN AUTO: 28.7 PG (ref 27–31)
MCHC RBC AUTO-ENTMCNC: 32.4 % (ref 32–36)
MCV RBC AUTO: 88.6 FL (ref 78–100)
MONOCYTES ABSOLUTE: 0.9 K/CU MM
MONOCYTES RELATIVE PERCENT: 6.5 % (ref 0–4)
MUCUS: ABNORMAL HPF
NITRITE URINE, QUANTITATIVE: NEGATIVE
NUCLEATED RBC %: 0 %
PDW BLD-RTO: 14.7 % (ref 11.7–14.9)
PH, URINE: 6.5 (ref 5–8)
PLATELET # BLD: 386 K/CU MM (ref 140–440)
PMV BLD AUTO: 9.5 FL (ref 7.5–11.1)
POTASSIUM SERPL-SCNC: 3.8 MMOL/L (ref 3.5–5.1)
PROTEIN UA: 30 MG/DL
RBC # BLD: 4.84 M/CU MM (ref 4.2–5.4)
RBC URINE: 22 /HPF (ref 0–6)
SEGMENTED NEUTROPHILS ABSOLUTE COUNT: 10.2 K/CU MM
SEGMENTED NEUTROPHILS RELATIVE PERCENT: 74.3 % (ref 36–66)
SODIUM BLD-SCNC: 140 MMOL/L (ref 135–145)
SPECIFIC GRAVITY UA: 1.02 (ref 1–1.03)
SQUAMOUS EPITHELIAL: 10 /HPF
TOTAL IMMATURE NEUTOROPHIL: 0.07 K/CU MM
TOTAL NUCLEATED RBC: 0 K/CU MM
TOTAL PROTEIN: 7.6 GM/DL (ref 6.4–8.2)
UROBILINOGEN, URINE: NORMAL MG/DL (ref 0.2–1)
WBC # BLD: 13.8 K/CU MM (ref 4–10.5)
WBC UA: 4 /HPF (ref 0–5)

## 2022-03-12 PROCEDURE — 99284 EMERGENCY DEPT VISIT MOD MDM: CPT

## 2022-03-12 PROCEDURE — 81001 URINALYSIS AUTO W/SCOPE: CPT

## 2022-03-12 PROCEDURE — 2580000003 HC RX 258: Performed by: PHYSICIAN ASSISTANT

## 2022-03-12 PROCEDURE — 96375 TX/PRO/DX INJ NEW DRUG ADDON: CPT

## 2022-03-12 PROCEDURE — 96372 THER/PROPH/DIAG INJ SC/IM: CPT

## 2022-03-12 PROCEDURE — 74177 CT ABD & PELVIS W/CONTRAST: CPT

## 2022-03-12 PROCEDURE — 6360000002 HC RX W HCPCS: Performed by: PHYSICIAN ASSISTANT

## 2022-03-12 PROCEDURE — 85025 COMPLETE CBC W/AUTO DIFF WBC: CPT

## 2022-03-12 PROCEDURE — 83690 ASSAY OF LIPASE: CPT

## 2022-03-12 PROCEDURE — 96374 THER/PROPH/DIAG INJ IV PUSH: CPT

## 2022-03-12 PROCEDURE — 84703 CHORIONIC GONADOTROPIN ASSAY: CPT

## 2022-03-12 PROCEDURE — 36415 COLL VENOUS BLD VENIPUNCTURE: CPT

## 2022-03-12 PROCEDURE — 6360000004 HC RX CONTRAST MEDICATION: Performed by: PHYSICIAN ASSISTANT

## 2022-03-12 PROCEDURE — 80053 COMPREHEN METABOLIC PANEL: CPT

## 2022-03-12 RX ORDER — ONDANSETRON 2 MG/ML
4 INJECTION INTRAMUSCULAR; INTRAVENOUS EVERY 6 HOURS PRN
Status: DISCONTINUED | OUTPATIENT
Start: 2022-03-12 | End: 2022-03-13 | Stop reason: HOSPADM

## 2022-03-12 RX ORDER — 0.9 % SODIUM CHLORIDE 0.9 %
500 INTRAVENOUS SOLUTION INTRAVENOUS ONCE
Status: COMPLETED | OUTPATIENT
Start: 2022-03-12 | End: 2022-03-12

## 2022-03-12 RX ORDER — HALOPERIDOL 5 MG/ML
5 INJECTION INTRAMUSCULAR ONCE
Status: COMPLETED | OUTPATIENT
Start: 2022-03-12 | End: 2022-03-12

## 2022-03-12 RX ORDER — DIPHENHYDRAMINE HYDROCHLORIDE 50 MG/ML
25 INJECTION INTRAMUSCULAR; INTRAVENOUS ONCE
Status: COMPLETED | OUTPATIENT
Start: 2022-03-12 | End: 2022-03-12

## 2022-03-12 RX ORDER — PROCHLORPERAZINE EDISYLATE 5 MG/ML
10 INJECTION INTRAMUSCULAR; INTRAVENOUS ONCE
Status: COMPLETED | OUTPATIENT
Start: 2022-03-12 | End: 2022-03-12

## 2022-03-12 RX ADMIN — HALOPERIDOL LACTATE 5 MG: 5 INJECTION, SOLUTION INTRAMUSCULAR at 21:57

## 2022-03-12 RX ADMIN — IOPAMIDOL 75 ML: 755 INJECTION, SOLUTION INTRAVENOUS at 20:43

## 2022-03-12 RX ADMIN — ONDANSETRON 4 MG: 2 INJECTION INTRAMUSCULAR; INTRAVENOUS at 20:22

## 2022-03-12 RX ADMIN — DIPHENHYDRAMINE HYDROCHLORIDE 25 MG: 50 INJECTION, SOLUTION INTRAMUSCULAR; INTRAVENOUS at 19:18

## 2022-03-12 RX ADMIN — SODIUM CHLORIDE 500 ML: 9 INJECTION, SOLUTION INTRAVENOUS at 19:16

## 2022-03-12 RX ADMIN — PROCHLORPERAZINE EDISYLATE 10 MG: 5 INJECTION INTRAMUSCULAR; INTRAVENOUS at 19:20

## 2022-03-12 ASSESSMENT — ENCOUNTER SYMPTOMS
BACK PAIN: 0
ABDOMINAL PAIN: 0
NAUSEA: 1
SHORTNESS OF BREATH: 0
RHINORRHEA: 0
VOMITING: 1
EYE PAIN: 0
COUGH: 0
DIARRHEA: 1

## 2022-03-12 ASSESSMENT — PAIN DESCRIPTION - ORIENTATION: ORIENTATION: UPPER;OTHER (COMMENT)

## 2022-03-12 ASSESSMENT — PAIN DESCRIPTION - DESCRIPTORS: DESCRIPTORS: DISCOMFORT

## 2022-03-12 ASSESSMENT — PAIN DESCRIPTION - LOCATION: LOCATION: ABDOMEN

## 2022-03-12 ASSESSMENT — PAIN DESCRIPTION - PAIN TYPE: TYPE: ACUTE PAIN

## 2022-03-12 ASSESSMENT — PAIN SCALES - GENERAL: PAINLEVEL_OUTOF10: 7

## 2022-03-12 ASSESSMENT — PAIN - FUNCTIONAL ASSESSMENT: PAIN_FUNCTIONAL_ASSESSMENT: 0-10

## 2022-03-12 NOTE — ED TRIAGE NOTES
Pt to ED today for abd pain and vomiting. Pt reports she was seen in ED last night for same sx and began vomiting again approx. 1 hr ago. Pt states her last marijuana use was a \"few days ago\".

## 2022-03-21 ENCOUNTER — TELEPHONE (OUTPATIENT)
Dept: GASTROENTEROLOGY | Age: 22
End: 2022-03-21

## 2022-03-21 NOTE — TELEPHONE ENCOUNTER
Called pt. And spoke w/ mother Harlan Ramos to [facility]. Appt from an external referral for vomiting, constipation and diarrhea.  Made appt for pt to see sarah on 3/28/22 @10:30am

## 2022-05-24 ENCOUNTER — OFFICE VISIT (OUTPATIENT)
Dept: OBGYN | Age: 22
End: 2022-05-24
Payer: COMMERCIAL

## 2022-05-24 VITALS
BODY MASS INDEX: 37.39 KG/M2 | SYSTOLIC BLOOD PRESSURE: 124 MMHG | HEIGHT: 64 IN | WEIGHT: 219 LBS | DIASTOLIC BLOOD PRESSURE: 83 MMHG

## 2022-05-24 DIAGNOSIS — N93.9 ABNORMAL UTERINE BLEEDING (AUB): ICD-10-CM

## 2022-05-24 DIAGNOSIS — Z09 HOSPITAL DISCHARGE FOLLOW-UP: Primary | ICD-10-CM

## 2022-05-24 PROCEDURE — 99214 OFFICE O/P EST MOD 30 MIN: CPT | Performed by: NURSE PRACTITIONER

## 2022-05-24 PROCEDURE — G8427 DOCREV CUR MEDS BY ELIG CLIN: HCPCS | Performed by: NURSE PRACTITIONER

## 2022-05-24 PROCEDURE — 4004F PT TOBACCO SCREEN RCVD TLK: CPT | Performed by: NURSE PRACTITIONER

## 2022-05-24 PROCEDURE — G8417 CALC BMI ABV UP PARAM F/U: HCPCS | Performed by: NURSE PRACTITIONER

## 2022-05-24 ASSESSMENT — ENCOUNTER SYMPTOMS
RESPIRATORY NEGATIVE: 1
GASTROINTESTINAL NEGATIVE: 1

## 2022-05-24 NOTE — PROGRESS NOTES
5/24/22    Sheri Goodson  2000    Chief Complaint   Patient presents with    Follow-up     pt here for ED f/u, states went to [facility] ed 5/16/22 for aub and cramping,told possible mab,  u/s and labs done, instructed to f/u with ob/gyn. c/o aub x 1 wk, heavy with clots, continues to have spotting and mild cramping. Sheri Goodson is a 25 y.o. female who presents today ER f/u. Pt st's she had 3 positive pregnancy test at home early May 2022. Started having severe cramping and menorrhagia 5/16/22. ER records show HCG <.6. Bldg has decreased and cramping is now mild. Past Medical History:   Diagnosis Date    Abnormal uterine bleeding (AUB)     ADHD (attention deficit hyperactivity disorder)     Anxiety     Bipolar 1 disorder (HCC)     Depression     Exposure to STD     Gonorrhea     History of irregular menstrual bleeding     Obesity     Pregnant     Vaginal itching     Vitamin D deficiency        History reviewed. No pertinent surgical history.     Social History     Tobacco Use    Smoking status: Current Every Day Smoker     Packs/day: 0.50     Types: Cigarettes     Start date: 2/17/2014    Smokeless tobacco: Never Used   Vaping Use    Vaping Use: Never used   Substance Use Topics    Alcohol use: Not Currently     Comment: rarely    Drug use: Not Currently     Types: Marijuana Darliss Meeter)       Family History   Problem Relation Age of Onset    Asthma Mother        Current Outpatient Medications   Medication Sig Dispense Refill    ondansetron (ZOFRAN) 4 MG tablet Take 1 tablet by mouth every 8 hours as needed for Nausea (Patient not taking: Reported on 10/27/2021) 10 tablet 0    metoclopramide (REGLAN) 10 MG tablet Take 1 tablet by mouth 3 times daily (with meals) (Patient not taking: Reported on 10/27/2021) 120 tablet 3    Capsaicin 0.035 % LOTN Apply 1 applicator topically 2 times daily (Patient not taking: Reported on 10/27/2021) 2 Bottle 1    ondansetron (ZOFRAN ODT) 4 MG disintegrating tablet Take 1 tablet by mouth every 6 hours (Patient not taking: Reported on 2022) 10 tablet 0    dicyclomine (BENTYL) 10 MG capsule Take 2 capsules by mouth 4 times daily (before meals and nightly) (Patient not taking: Reported on 10/27/2021) 30 capsule 0    ranitidine (ZANTAC) 300 MG tablet Take 1 tablet by mouth nightly (Patient not taking: Reported on 10/27/2021) 30 tablet 0    acetaminophen (APAP EXTRA STRENGTH) 500 MG tablet Take 1 tablet by mouth every 6 hours as needed for Pain (Patient not taking: Reported on 2022) 30 tablet 0     No current facility-administered medications for this visit. No Known Allergies        Immunization History   Administered Date(s) Administered    Tdap (Boostrix, Adacel) 2018       Review of Systems   Constitutional: Negative. Respiratory: Negative. Gastrointestinal: Negative. Genitourinary: Negative. /83   Ht 5' 4\" (1.626 m)   Wt 219 lb (99.3 kg)   LMP 2022   BMI 37.59 kg/m²     Physical Exam  Vitals and nursing note reviewed. Constitutional:       Appearance: She is obese. HENT:      Head: Normocephalic. Pulmonary:      Effort: Pulmonary effort is normal.   Musculoskeletal:         General: Normal range of motion. Cervical back: Normal range of motion. Skin:     General: Skin is warm and dry. Neurological:      Mental Status: She is alert. Psychiatric:         Mood and Affect: Mood normal.         No results found for this visit on 22. ASSESSMENT AND PLAN   Diagnosis Orders   1. Hospital discharge follow-up     2. Abnormal uterine bleeding (AUB)  US NON OB TRANSVAGINAL     Blood type A+  U/s with f/u scheduled    No follow-ups on file.     Kiarra Wright, APRN - CNP

## 2022-05-25 ENCOUNTER — TELEPHONE (OUTPATIENT)
Dept: OBGYN | Age: 22
End: 2022-05-25

## 2022-08-30 ENCOUNTER — OFFICE VISIT (OUTPATIENT)
Dept: OBGYN | Age: 22
End: 2022-08-30
Payer: COMMERCIAL

## 2022-08-30 DIAGNOSIS — A60.04 HERPES SIMPLEX VULVOVAGINITIS: Primary | ICD-10-CM

## 2022-08-30 PROBLEM — R21 VULVAR RASH: Status: ACTIVE | Noted: 2022-08-30

## 2022-08-30 PROCEDURE — G8427 DOCREV CUR MEDS BY ELIG CLIN: HCPCS | Performed by: NURSE PRACTITIONER

## 2022-08-30 PROCEDURE — 4004F PT TOBACCO SCREEN RCVD TLK: CPT | Performed by: NURSE PRACTITIONER

## 2022-08-30 PROCEDURE — 99214 OFFICE O/P EST MOD 30 MIN: CPT | Performed by: NURSE PRACTITIONER

## 2022-08-30 PROCEDURE — G8417 CALC BMI ABV UP PARAM F/U: HCPCS | Performed by: NURSE PRACTITIONER

## 2022-08-30 RX ORDER — LIDOCAINE 50 MG/G
OINTMENT TOPICAL
Qty: 30 G | Refills: 1 | Status: SHIPPED | OUTPATIENT
Start: 2022-08-30

## 2022-08-30 RX ORDER — VALACYCLOVIR HYDROCHLORIDE 500 MG/1
500 TABLET, FILM COATED ORAL 2 TIMES DAILY
Qty: 60 TABLET | Refills: 11 | Status: SHIPPED | OUTPATIENT
Start: 2022-08-30

## 2022-08-30 ASSESSMENT — PATIENT HEALTH QUESTIONNAIRE - PHQ9
1. LITTLE INTEREST OR PLEASURE IN DOING THINGS: 0
SUM OF ALL RESPONSES TO PHQ QUESTIONS 1-9: 0
SUM OF ALL RESPONSES TO PHQ QUESTIONS 1-9: 0
2. FEELING DOWN, DEPRESSED OR HOPELESS: 0
SUM OF ALL RESPONSES TO PHQ QUESTIONS 1-9: 0
SUM OF ALL RESPONSES TO PHQ QUESTIONS 1-9: 0
SUM OF ALL RESPONSES TO PHQ9 QUESTIONS 1 & 2: 0

## 2022-08-30 ASSESSMENT — ENCOUNTER SYMPTOMS
RESPIRATORY NEGATIVE: 1
GASTROINTESTINAL NEGATIVE: 1

## 2022-08-30 NOTE — PROGRESS NOTES
8/30/22    Waldo Joe  2000    Chief Complaint   Patient presents with    Other     Pt c/o vulvar rash x 3 days. States went to [facility] ER 8/28/22. Std testing done, states positive for genital herpes. Waldo Joe is a 25 y.o. female who presents today for evaluation of vulvar irritation/pain    Past Medical History:   Diagnosis Date    Abnormal uterine bleeding (AUB)     ADHD (attention deficit hyperactivity disorder)     Anxiety     Bipolar 1 disorder (HCC)     Depression     Exposure to STD     Gonorrhea     Herpes simplex virus (HSV) infection     History of irregular menstrual bleeding     Obesity     Pregnant     Vaginal itching     Vitamin D deficiency     Vulvar rash        No past surgical history on file. Social History     Tobacco Use    Smoking status: Every Day     Packs/day: 0.50     Types: Cigarettes     Start date: 2/17/2014    Smokeless tobacco: Never   Vaping Use    Vaping Use: Never used   Substance Use Topics    Alcohol use: Not Currently     Comment: rarely    Drug use: Not Currently     Types: Marijuana Sarah Co[place])       Family History   Problem Relation Age of Onset    Asthma Mother        Current Outpatient Medications   Medication Sig Dispense Refill    valACYclovir (VALTREX) 500 MG tablet Take 1 tablet by mouth 2 times daily 60 tablet 11    lidocaine (XYLOCAINE) 5 % ointment Apply topically as needed.  30 g 1    ondansetron (ZOFRAN) 4 MG tablet Take 1 tablet by mouth every 8 hours as needed for Nausea (Patient not taking: Reported on 10/27/2021) 10 tablet 0    metoclopramide (REGLAN) 10 MG tablet Take 1 tablet by mouth 3 times daily (with meals) (Patient not taking: Reported on 10/27/2021) 120 tablet 3    Capsaicin 0.035 % LOTN Apply 1 applicator topically 2 times daily (Patient not taking: Reported on 10/27/2021) 2 Bottle 1    ondansetron (ZOFRAN ODT) 4 MG disintegrating tablet Take 1 tablet by mouth every 6 hours (Patient not taking: Reported on 2/17/2022) 10 tablet 0    dicyclomine (BENTYL) 10 MG capsule Take 2 capsules by mouth 4 times daily (before meals and nightly) (Patient not taking: Reported on 10/27/2021) 30 capsule 0    ranitidine (ZANTAC) 300 MG tablet Take 1 tablet by mouth nightly (Patient not taking: Reported on 10/27/2021) 30 tablet 0    acetaminophen (APAP EXTRA STRENGTH) 500 MG tablet Take 1 tablet by mouth every 6 hours as needed for Pain (Patient not taking: Reported on 2022) 30 tablet 0     No current facility-administered medications for this visit. No Known Allergies        Immunization History   Administered Date(s) Administered    Tdap (Boostrix, Adacel) 2018       Review of Systems   Constitutional: Negative. Respiratory: Negative. Gastrointestinal: Negative. Genitourinary: Negative. LMP 2022     Physical Exam  Vitals and nursing note reviewed. Constitutional:       Appearance: Normal appearance. She is normal weight. HENT:      Head: Normocephalic. Pulmonary:      Effort: Pulmonary effort is normal.   Abdominal:      Palpations: Abdomen is soft. Genitourinary:     General: Normal vulva. Comments: Herpes lesion R inner labia majora   Musculoskeletal:         General: Normal range of motion. Cervical back: Normal range of motion. Skin:     General: Skin is warm and dry. Neurological:      Mental Status: She is alert. Psychiatric:         Attention and Perception: Attention normal.         Mood and Affect: Mood normal.       No results found for this visit on 22. ASSESSMENT AND PLAN   Diagnosis Orders   1. Herpes simplex vulvovaginitis            HSV discussed to include s/s, transmission and treatment options. Handout given. No follow-ups on file.     Mirela Holden, APRN - CNP

## 2022-08-31 NOTE — ED PROVIDER NOTES
[address] ENCOUNTER        Pt Name: Jemal Leung  MRN: [id number]  [place] 2000  Date of evaluation: 3/12/2022  Provider: Carolyn Dutta PA-C  PCP: No primary care provider on file. Note Started: 6:45 PM EST      LEEANN. I have evaluated this patient. My supervising physician was available for consultation. Triage CHIEF COMPLAINT       Chief Complaint   Patient presents with    Abdominal Pain     seen last night for same sx    Emesis         HISTORY OF PRESENT ILLNESS   (Location/Symptom, Timing/Onset, Context/Setting, Quality, Duration, Modifying Factors, Severity)  Note limiting factors. Chief Complaint: n/v    Jemal Leung is a 25 y.o. female who presents with complaint of nausea and vomiting. Nursing reports that patient was seen yesterday for the same. Patient states that she was feeling better he prior to her discharge last night, she has been unable to get her antiemetics filled. She describes nausea vomiting had returned approximately 1 hour prior to arrival here. She states that she has not really urinated much but otherwise denies any urinary symptoms. She mentions she is currently on her period. Denies any fever, URI symptoms, chest pain, shortness of breath, vaginal discharge, dyspareunia, dysuria    Nursing Notes were all reviewed and agreed with or any disagreements were addressed in the HPI. REVIEW OF SYSTEMS    (2-9 systems for level 4, 10 or more for level 5)     Review of Systems   Constitutional: Negative for chills and fever. HENT: Negative for congestion and rhinorrhea. Eyes: Negative for pain and visual disturbance. Respiratory: Negative for cough and shortness of breath. Cardiovascular: Negative for chest pain and leg swelling. Gastrointestinal: Positive for diarrhea, nausea and vomiting. Negative for abdominal pain.    Genitourinary: Negative for dysuria and hematuria. Musculoskeletal: Negative for back pain and myalgias. Skin: Negative for rash and wound. Neurological: Negative for dizziness and light-headedness. PAST MEDICAL HISTORY     Past Medical History:   Diagnosis Date    ADHD (attention deficit hyperactivity disorder)     Anxiety     Bipolar 1 disorder ([address]     Depression     Exposure to STD     Gonorrhea     History of irregular menstrual bleeding     Obesity     Vaginal itching     Vitamin D deficiency        SURGICAL HISTORY   History reviewed. No pertinent surgical history. CURRENTMEDICATIONS       Previous Medications    ACETAMINOPHEN (APAP EXTRA STRENGTH) 500 MG TABLET    Take 1 tablet by mouth every 6 hours as needed for Pain    ARIPIPRAZOLE (ABILIFY) 5 MG TABLET    Take 10 mg by mouth daily. CAPSAICIN 0.035 % LOTN    Apply 1 applicator topically 2 times daily    DICYCLOMINE (BENTYL) 10 MG CAPSULE    Take 2 capsules by mouth 4 times daily (before meals and nightly)    HYDROXYZINE (VISTARIL) 25 MG CAPSULE    Take 1 capsule by mouth three times daily    METOCLOPRAMIDE (REGLAN) 10 MG TABLET    Take 1 tablet by mouth 3 times daily (with meals)    ONDANSETRON (ZOFRAN ODT) 4 MG DISINTEGRATING TABLET    Take 1 tablet by mouth every 6 hours    ONDANSETRON (ZOFRAN) 4 MG TABLET    Take 1 tablet by mouth every 8 hours as needed for Nausea    PROMETHAZINE (PHENERGAN) 25 MG TABLET    Take 1 tablet by mouth every 6 hours as needed for Nausea    RANITIDINE (ZANTAC) 300 MG TABLET    Take 1 tablet by mouth nightly    TRAZODONE (DESYREL) 50 MG TABLET    Take 50 mg by mouth nightly       ALLERGIES     Patient has no known allergies.     FAMILYHISTORY       Family History   Problem Relation Age of Onset    Asthma Mother         SOCIAL HISTORY       Social History     Socioeconomic History    Marital status: Single     Spouse name: None    Number of children: None    Years of education: None    Highest education level: None Occupational History    None   Tobacco Use    Smoking status: Current Every Day Smoker     Packs/day: 0.50     Types: Cigarettes     Start date: 2/17/2014    Smokeless tobacco: Never Used   Vaping Use    Vaping Use: Never used   Substance and Sexual Activity    Alcohol use: Yes     Comment: rarely    Drug use: Yes     Types: Marijuana Jean Collier)    Sexual activity: Never   Other Topics Concern    None   Social History Narrative    None     Social Determinants of Health     Financial Resource Strain: Low Risk     Difficulty of Paying Living Expenses: Not hard at all   Food Insecurity: No Food Insecurity    Worried About Running Out of Food in the Last Year: Never true    David of Food in the Last Year: Never true   Transportation Needs:     Lack of Transportation (Medical): Not on file    Lack of Transportation (Non-Medical):  Not on file   Physical Activity:     Days of Exercise per Week: Not on file    Minutes of Exercise per Session: Not on file   Stress:     Feeling of Stress : Not on file   Social Connections:     Frequency of Communication with Friends and Family: Not on file    Frequency of Social Gatherings with Friends and Family: Not on file    Attends Taoism Services: Not on file    Active Member of [address] [place] [address] [employer] or Organizations: Not on file    Attends Club or Organization Meetings: Not on file    Marital Status: Not on file   Intimate Partner Violence:     Fear of Current or Ex-Partner: Not on file    Emotionally Abused: Not on file    Physically Abused: Not on file    Sexually Abused: Not on file   Housing Stability:     Unable to Pay for Housing in the Last Year: Not on file    Number of [place] in the Last Year: Not on file    Unstable Housing in the Last Year: Not on file       SCREENINGS    [place] Coma Scale  Eye Opening: Spontaneous  Best Verbal Response: Oriented  Best Motor Response: Obeys commands  [place] Coma Scale Score: 15        PHYSICAL EXAM    (up to 7 for level 4, 8 or more for level 5)     ED Triage Vitals [03/12/22 1840]   BP Temp Temp Source Pulse Resp SpO2 Height Weight   (!) 172/107 99.1 °F (37.3 °C) Oral 78 18 100 % 5' 4\" (1.626 m) 220 lb (99.8 kg)       Physical Exam  Vitals and nursing note reviewed. Constitutional:       Appearance: Normal appearance. She is well-developed. She is not ill-appearing or diaphoretic. HENT:      Head: Normocephalic and atraumatic. Right Ear: External ear normal.      Left Ear: External ear normal.      Nose: Nose normal.   Eyes:      General: No scleral icterus. Right eye: No discharge. Left eye: No discharge. Cardiovascular:      Rate and Rhythm: Normal rate and regular rhythm. Heart sounds: Normal heart sounds. No murmur heard. No friction rub. No gallop. Pulmonary:      Effort: Pulmonary effort is normal. No respiratory distress. Breath sounds: Normal breath sounds. No stridor. No wheezing or rales. Chest:      Chest wall: No tenderness. Abdominal:      General: Bowel sounds are normal. There is no distension. Palpations: Abdomen is soft. There is no mass. Tenderness: There is no abdominal tenderness. There is no guarding or rebound. Musculoskeletal:         General: No tenderness. Normal range of motion. Cervical back: Normal range of motion and neck supple. Skin:     General: Skin is warm and dry. Coloration: Skin is not pale. Neurological:      General: No focal deficit present. Mental Status: She is alert and oriented to person, place, and time.       Coordination: Coordination normal.   Psychiatric:         Mood and Affect: Mood normal.         Behavior: Behavior normal.         DIAGNOSTIC RESULTS   LABS:    Labs Reviewed   CBC WITH AUTO DIFFERENTIAL - Abnormal; Notable for the following components:       Result Value    WBC 13.8 (*)     Segs Relative 74.3 (*)     Lymphocytes % 18.2 (*)     Monocytes % 6.5 (*)     Immature Neutrophil % 0.5 (*) All other components within normal limits   COMPREHENSIVE METABOLIC PANEL - Abnormal; Notable for the following components:    Glucose 121 (*)     All other components within normal limits   HCG, SERUM, QUALITATIVE   LIPASE       When ordered, only abnormal lab results are displayed. All other labs were within normal range or not returned as of this dictation. EKG: When ordered, EKG's are interpreted by the Emergency Department Physician in the absence of a cardiologist.  Please see their note for interpretation of EKG. RADIOLOGY:   Non-plain film images such as CT, Ultrasound and MRI are read by the radiologist. Plain radiographic images are visualized andpreliminarily interpreted by the  ED Provider with the below findings:        Interpretation pert Radiologist below, if available at the time of this note:    CT ABDOMEN PELVIS W IV CONTRAST Additional Contrast? None   Final Result   No acute abdominopelvic abnormality. No results found.       PROCEDURES   Unless otherwise noted below, none     Procedures    CRITICAL CARE TIME   N/A    CONSULTS:  None      EMERGENCY DEPARTMENT COURSE and DIFFERENTIAL DIAGNOSIS/MDM:   Vitals:    Vitals:    03/12/22 1840 03/12/22 1921 03/12/22 2003   BP: (!) 172/107 (!) 166/106 (!) 152/81   Pulse: 78 62 76   Resp: 18 18 19   Temp: 99.1 °F (37.3 °C)     TempSrc: Oral     SpO2: 100% 100% 100%   Weight: 220 lb (99.8 kg)     Height: 5' 4\" (1.626 m)         Patient was given thefollowing medications:  Medications   ondansetron (ZOFRAN) injection 4 mg (4 mg IntraVENous Given 3/12/22 2022)   prochlorperazine (COMPAZINE) injection 10 mg (10 mg IntraVENous Given 3/12/22 1920)   0.9 % sodium chloride bolus (500 mLs IntraVENous New Bag 3/12/22 1916)   diphenhydrAMINE (BENADRYL) injection 25 mg (25 mg IntraVENous Given 3/12/22 1918)   iopamidol (ISOVUE-370) 76 % injection 75 mL (75 mLs IntraVENous Given 3/12/22 2043)           20-year-old female arrives via private vehicle from home with above HPI. Reviewing EMR, patient seen last night, symptoms improved after antiemetics, she is not having abdominal pain. Discharge home in improved condition. Today on my examination,Hypertensive otherwise vitals within normal limits. Dry heaving, but in no acute distress. Considering marijuana hyperemesis syndrome, however will plan for repeat lab work, fluids antiemetics repeat abdominal examination. @19:35 patient did receive antiemetics. Currently receiving fluids. On exam she appears to be more comfortable. No longer dry heaving. Abdomen is soft nontender she does endorse some mild abdominal pain. Denying any dyspareunia or vaginal discharge. I have ordered CT abdomen pelvis. @21:35 CT abdomen pelvis unremarkable. Patient continuing to describe feeling nauseated. She appears to be more comfortable on examination. She was inquiring about her vitamin D level so I have asked her to follow-up with her primary care provider for that. We will will Haldol here for continued. UA pedning      Patient's blood work tonight within normal limits and is reassuring. She does have a slight leukocytosis, but has been afebrile at home. She is denying any urinary symptoms, vaginal discharge, dyspareunia . She is currently on her menstrual period this may be reactive. She has had similar visits to the emergency department, with generalized nausea vomiting. 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 Today she does endorse some diarrhea, possible viral etiology as well.vaginal discharge, dyspareunia   Her presentation and exam findings are consistent with that today as well. She has responded to antiemetics. At this time, less concerning for UTI, acute abdominal pelvic findings, will treat symptomatically and discharged home. She was given a prescription of antiemetics last night I have asked her to have that filled and continue to use it and follow-up with her PCP.          FINAL IMPRESSION      1. Intractable vomiting with nausea, unspecified vomiting type    2. Diarrhea, unspecified type          DISPOSITION/PLAN   DISPOSITION        PATIENT REFERREDTO:  No follow-up provider specified.     DISCHARGE MEDICATIONS:  New Prescriptions    No medications on file       DISCONTINUED MEDICATIONS:  Discontinued Medications    No medications on file              (Please note that portions ofthis note were completed with a voice recognition program.  Efforts were made to edit the dictations but occasionally words are mis-transcribed.)    Jerardo Brannon PA-C (electronically signed)             Jerardo Brannon PA-C  03/13/22 2759 electronic

## 2022-09-21 RX ORDER — VALACYCLOVIR HYDROCHLORIDE 500 MG/1
TABLET, FILM COATED ORAL
Qty: 60 TABLET | Refills: 11 | OUTPATIENT
Start: 2022-09-21

## 2023-06-19 ENCOUNTER — HOSPITAL ENCOUNTER (EMERGENCY)
Age: 23
Discharge: LEFT BEFORE DISCHARGE | End: 2023-06-19

## 2023-06-20 ENCOUNTER — APPOINTMENT (OUTPATIENT)
Dept: CT IMAGING | Age: 23
End: 2023-06-20
Payer: COMMERCIAL

## 2023-06-20 ENCOUNTER — HOSPITAL ENCOUNTER (EMERGENCY)
Age: 23
Discharge: HOME | End: 2023-06-20
Attending: STUDENT IN AN ORGANIZED HEALTH CARE EDUCATION/TRAINING PROGRAM
Payer: COMMERCIAL

## 2023-06-20 VITALS
BODY MASS INDEX: 39.27 KG/M2 | HEIGHT: 64 IN | RESPIRATION RATE: 20 BRPM | SYSTOLIC BLOOD PRESSURE: 127 MMHG | WEIGHT: 230 LBS | TEMPERATURE: 97.7 F | DIASTOLIC BLOOD PRESSURE: 97 MMHG | HEART RATE: 64 BPM | OXYGEN SATURATION: 100 %

## 2023-06-20 DIAGNOSIS — S39.012A BACK STRAIN, INITIAL ENCOUNTER: Primary | ICD-10-CM

## 2023-06-20 LAB
BACTERIA: NEGATIVE /HPF
BILIRUBIN URINE: NEGATIVE MG/DL
BLOOD, URINE: ABNORMAL
CLARITY: CLEAR
COLOR: YELLOW
GLUCOSE, URINE: NEGATIVE MG/DL
INTERPRETATION: NORMAL
KETONES, URINE: ABNORMAL MG/DL
LEUKOCYTE ESTERASE, URINE: NEGATIVE
MUCUS: ABNORMAL HPF
NITRITE URINE, QUANTITATIVE: NEGATIVE
PH, URINE: 5.5 (ref 5–8)
PREGNANCY, URINE: NEGATIVE
PROTEIN UA: ABNORMAL MG/DL
RBC URINE: 265 /HPF (ref 0–6)
SPECIFIC GRAVITY UA: >1.03 (ref 1–1.03)
SQUAMOUS EPITHELIAL: 48 /HPF
TRICHOMONAS: ABNORMAL /HPF
UROBILINOGEN, URINE: 0.2 MG/DL (ref 0.2–1)
WBC UA: 3 /HPF (ref 0–5)

## 2023-06-20 PROCEDURE — 6360000002 HC RX W HCPCS: Performed by: STUDENT IN AN ORGANIZED HEALTH CARE EDUCATION/TRAINING PROGRAM

## 2023-06-20 PROCEDURE — 99284 EMERGENCY DEPT VISIT MOD MDM: CPT

## 2023-06-20 PROCEDURE — 81001 URINALYSIS AUTO W/SCOPE: CPT

## 2023-06-20 PROCEDURE — 72131 CT LUMBAR SPINE W/O DYE: CPT

## 2023-06-20 PROCEDURE — 6370000000 HC RX 637 (ALT 250 FOR IP): Performed by: STUDENT IN AN ORGANIZED HEALTH CARE EDUCATION/TRAINING PROGRAM

## 2023-06-20 PROCEDURE — 96372 THER/PROPH/DIAG INJ SC/IM: CPT

## 2023-06-20 PROCEDURE — 81025 URINE PREGNANCY TEST: CPT

## 2023-06-20 RX ORDER — ACETAMINOPHEN 500 MG
1000 TABLET ORAL ONCE
Status: COMPLETED | OUTPATIENT
Start: 2023-06-20 | End: 2023-06-20

## 2023-06-20 RX ORDER — ORPHENADRINE CITRATE 30 MG/ML
60 INJECTION INTRAMUSCULAR; INTRAVENOUS ONCE
Status: COMPLETED | OUTPATIENT
Start: 2023-06-20 | End: 2023-06-20

## 2023-06-20 RX ADMIN — ORPHENADRINE CITRATE 60 MG: 60 INJECTION INTRAMUSCULAR; INTRAVENOUS at 06:18

## 2023-06-20 RX ADMIN — ACETAMINOPHEN 1000 MG: 500 TABLET ORAL at 06:17

## 2023-06-20 ASSESSMENT — PAIN SCALES - GENERAL: PAINLEVEL_OUTOF10: 8

## 2023-06-20 ASSESSMENT — PAIN DESCRIPTION - LOCATION
LOCATION: BACK
LOCATION: BACK

## 2023-06-20 ASSESSMENT — PAIN DESCRIPTION - ORIENTATION: ORIENTATION: LOWER

## 2023-06-20 ASSESSMENT — PAIN DESCRIPTION - PAIN TYPE: TYPE: ACUTE PAIN

## 2023-06-20 NOTE — ED TRIAGE NOTES
Pt c/o low back pain X 2 weeks. States the pain has progressively gotten worse. Pt states she has taken OTC hermes back and body and has been resting with no imp.

## 2023-06-20 NOTE — DISCHARGE INSTRUCTIONS
Follow-up with primary care as soon as possible  Follow-up with orthopedic spine per referral  Take OTC medication as needed for pain  Take muscle relaxants as needed for pain. Remember that you are advised not to drink, drive, hike, swim, climb, or operate complex machines or tasks while taking muscle relaxants  Return to the ED if symptoms persist or worsen, or if you have any fever, focal neurologic deficits including weakness or numbness of the lower extremities, numbness in the vulvar region, bladder or bowel incontinence, or any other significant symptom of concern.

## 2023-06-20 NOTE — ED NOTES
Pt states she is not able to leave a urine sample at this time.      Will try again at a later time     Denise Barajas RN  06/20/23 1990

## 2023-06-20 NOTE — ED PROVIDER NOTES
days.  Patient endorses excruciating back pain worsened by movements. Patient states that she has taken some over-the-counter medications for pain but did not get any significant improvement. Patient does not endorse any history of any obvious triggering event for her symptoms. No history of trama, unplanned weight loss, neurological deficits, fever, IV drug use/abuse, steroid use, or cancer. No lightheadedness, abdominal pain, dysuria, diarrhea or constipation. Physical examination is significant for tenderness in the lumbar spine, no step-offs. Negative straight leg raise test. No neurologic deficits    Differentials considered include, but are not limited to:  --musculoskeletal pain, epidural hematoma,   --epidural abscess, spinal stenosis, discitis, meningitis, fracture etc.    Previous notes reviewed:   none    EKG:   none    Laboratory investigation include:  - Considered: CBC, Electrolytes, Urinalysis, Lipase,   - Ordered: None    Radiologic investigations include:  -Considered include: CT-lumbar spine   -Ordered: Same  -Significant finding: Unremarkable      Medications:   Medications   orphenadrine (NORFLEX) injection 60 mg (60 mg IntraMUSCular Given 6/20/23 0618)   acetaminophen (TYLENOL) tablet 1,000 mg (1,000 mg Oral Given 6/20/23 0617)         Medications   orphenadrine (NORFLEX) injection 60 mg (60 mg IntraMUSCular Given 6/20/23 0618)   acetaminophen (TYLENOL) tablet 1,000 mg (1,000 mg Oral Given 6/20/23 0617)       Procedure:  Procedures    Consults   none    Social Determinants affecting management or disposition:  none    Reevaluation  8:15 AM  Patient endorses improvement in her symptoms  Patient is comfortable going home at this time.     Disposition  Considered: Discharge  Final disposition: Discharge        20-year-old female presenting to the ED with a history of low back pain for the past couple of days    Patient physical examination significant for tenderness to the lumbar spine

## 2023-08-23 ENCOUNTER — OFFICE VISIT (OUTPATIENT)
Dept: OBGYN | Age: 23
End: 2023-08-23
Payer: COMMERCIAL

## 2023-08-23 VITALS
SYSTOLIC BLOOD PRESSURE: 135 MMHG | HEIGHT: 64 IN | BODY MASS INDEX: 40.97 KG/M2 | WEIGHT: 240 LBS | DIASTOLIC BLOOD PRESSURE: 88 MMHG

## 2023-08-23 DIAGNOSIS — N89.8 VAGINAL ODOR: ICD-10-CM

## 2023-08-23 DIAGNOSIS — N94.10 DYSPAREUNIA, FEMALE: ICD-10-CM

## 2023-08-23 DIAGNOSIS — N89.8 VAGINAL IRRITATION: Primary | ICD-10-CM

## 2023-08-23 PROCEDURE — G8417 CALC BMI ABV UP PARAM F/U: HCPCS | Performed by: NURSE PRACTITIONER

## 2023-08-23 PROCEDURE — G8427 DOCREV CUR MEDS BY ELIG CLIN: HCPCS | Performed by: NURSE PRACTITIONER

## 2023-08-23 PROCEDURE — 99214 OFFICE O/P EST MOD 30 MIN: CPT | Performed by: NURSE PRACTITIONER

## 2023-08-23 PROCEDURE — 4004F PT TOBACCO SCREEN RCVD TLK: CPT | Performed by: NURSE PRACTITIONER

## 2023-08-23 RX ORDER — METRONIDAZOLE 500 MG/1
500 TABLET ORAL 2 TIMES DAILY
Qty: 14 TABLET | Refills: 0 | Status: SHIPPED | OUTPATIENT
Start: 2023-08-23

## 2023-08-23 SDOH — ECONOMIC STABILITY: TRANSPORTATION INSECURITY
IN THE PAST 12 MONTHS, HAS LACK OF TRANSPORTATION KEPT YOU FROM MEETINGS, WORK, OR FROM GETTING THINGS NEEDED FOR DAILY LIVING?: NO

## 2023-08-23 SDOH — ECONOMIC STABILITY: FOOD INSECURITY: WITHIN THE PAST 12 MONTHS, YOU WORRIED THAT YOUR FOOD WOULD RUN OUT BEFORE YOU GOT MONEY TO BUY MORE.: SOMETIMES TRUE

## 2023-08-23 SDOH — ECONOMIC STABILITY: HOUSING INSECURITY
IN THE LAST 12 MONTHS, WAS THERE A TIME WHEN YOU DID NOT HAVE A STEADY PLACE TO SLEEP OR SLEPT IN A SHELTER (INCLUDING NOW)?: NO

## 2023-08-23 SDOH — ECONOMIC STABILITY: FOOD INSECURITY: WITHIN THE PAST 12 MONTHS, THE FOOD YOU BOUGHT JUST DIDN'T LAST AND YOU DIDN'T HAVE MONEY TO GET MORE.: SOMETIMES TRUE

## 2023-08-23 SDOH — ECONOMIC STABILITY: INCOME INSECURITY: HOW HARD IS IT FOR YOU TO PAY FOR THE VERY BASICS LIKE FOOD, HOUSING, MEDICAL CARE, AND HEATING?: SOMEWHAT HARD

## 2023-08-23 ASSESSMENT — PATIENT HEALTH QUESTIONNAIRE - PHQ9
2. FEELING DOWN, DEPRESSED OR HOPELESS: 0
SUM OF ALL RESPONSES TO PHQ QUESTIONS 1-9: 0
SUM OF ALL RESPONSES TO PHQ9 QUESTIONS 1 & 2: 0
SUM OF ALL RESPONSES TO PHQ QUESTIONS 1-9: 0
SUM OF ALL RESPONSES TO PHQ QUESTIONS 1-9: 0
1. LITTLE INTEREST OR PLEASURE IN DOING THINGS: 0
SUM OF ALL RESPONSES TO PHQ QUESTIONS 1-9: 0

## 2023-08-23 ASSESSMENT — ENCOUNTER SYMPTOMS
RESPIRATORY NEGATIVE: 1
VOMITING: 0
DIARRHEA: 0
CONSTIPATION: 0
ABDOMINAL PAIN: 0
NAUSEA: 0
GASTROINTESTINAL NEGATIVE: 1
SHORTNESS OF BREATH: 0

## 2023-08-23 NOTE — PROGRESS NOTES
23    Vania Anderson  2000    Chief Complaint   Patient presents with    Other     Pt c/o vaginal irritation with itching and odor x 1wk. Dyspareunia     Pt c/o achy pressure pain during intercourse 2-2 1/2 months. Does not happen everytime but most of the time. Vania Anderson is a 21 y.o. female who presents today for evaluation of vaginal irritation/odor and dyspareunia. Declines exam due to heavy menses    Past Medical History:   Diagnosis Date    Abnormal uterine bleeding (AUB)     ADHD (attention deficit hyperactivity disorder)     Anxiety     Bipolar 1 disorder (HCC)     Depression     Exposure to STD     Gonorrhea     Herpes simplex virus (HSV) infection     History of irregular menstrual bleeding     Miscarriage     Obesity     Pregnant     Vaginal itching     Vitamin D deficiency     Vulvar rash        No past surgical history on file. Social History     Tobacco Use    Smoking status: Every Day     Packs/day: 0.50     Types: Cigarettes     Start date: 2014    Smokeless tobacco: Never   Vaping Use    Vaping Use: Never used   Substance Use Topics    Alcohol use: Not Currently     Comment: rarely    Drug use: Not Currently     Types: Marijuana [place])       Family History   Problem Relation Age of Onset    Asthma Mother        Current Outpatient Medications   Medication Sig Dispense Refill    metroNIDAZOLE (FLAGYL) 500 MG tablet Take 1 tablet by mouth in the morning and 1 tablet in the evening. 14 tablet 0    vitamin D 25 MCG (1000 UT) CAPS Take 1 capsule by mouth daily (Patient not taking: Reported on 2023)      metoclopramide (REGLAN) 10 MG tablet Take 1 tablet by mouth 3 times daily (with meals) (Patient not taking: Reported on 10/27/2021) 120 tablet 3     No current facility-administered medications for this visit.        No Known Allergies        Immunization History   Administered Date(s) Administered    TDaP, ADACEL (age 6y-58y), [place] Cardenas (age

## 2023-08-24 LAB
BACTERIA UR CULT: NORMAL
C TRACH DNA UR QL NAA+PROBE: NEGATIVE
N GONORRHOEA DNA UR QL NAA+PROBE: NEGATIVE

## 2023-09-14 ENCOUNTER — OFFICE VISIT (OUTPATIENT)
Dept: OBGYN | Age: 23
End: 2023-09-14

## 2023-09-14 VITALS
SYSTOLIC BLOOD PRESSURE: 147 MMHG | HEIGHT: 64 IN | DIASTOLIC BLOOD PRESSURE: 102 MMHG | WEIGHT: 238 LBS | BODY MASS INDEX: 40.63 KG/M2

## 2023-09-14 DIAGNOSIS — N89.8 VAGINAL IRRITATION: ICD-10-CM

## 2023-09-14 DIAGNOSIS — E66.01 MORBID OBESITY (HCC): ICD-10-CM

## 2023-09-14 DIAGNOSIS — R03.0 ELEVATED BLOOD PRESSURE READING IN OFFICE WITH WHITE COAT SYNDROME, WITHOUT DIAGNOSIS OF HYPERTENSION: ICD-10-CM

## 2023-09-14 DIAGNOSIS — Z01.419 WOMEN'S ANNUAL ROUTINE GYNECOLOGICAL EXAMINATION: Primary | ICD-10-CM

## 2023-09-14 DIAGNOSIS — E28.2 PCOS (POLYCYSTIC OVARIAN SYNDROME): ICD-10-CM

## 2023-09-14 LAB
FSH SERPL-ACNC: 6.4 MIU/ML
INSULIN COMMENT: NORMAL
LH SERPL-ACNC: 12.9 MIU/ML
PROLACTIN SERPL IA-MCNC: 6.5 NG/ML
TSH SERPL DL<=0.005 MIU/L-ACNC: 0.79 UIU/ML (ref 0.27–4.2)

## 2023-09-14 RX ORDER — METRONIDAZOLE 500 MG/1
500 TABLET ORAL 2 TIMES DAILY
Qty: 14 TABLET | Refills: 0 | Status: SHIPPED | OUTPATIENT
Start: 2023-09-14

## 2023-09-14 ASSESSMENT — ENCOUNTER SYMPTOMS
VOMITING: 0
GASTROINTESTINAL NEGATIVE: 1
CONSTIPATION: 0
RESPIRATORY NEGATIVE: 1
DIARRHEA: 0
SHORTNESS OF BREATH: 0
ABDOMINAL PAIN: 0
NAUSEA: 0

## 2023-09-14 NOTE — PROGRESS NOTES
23    Evaline Crystal  2000    Chief Complaint   Patient presents with    Gynecologic Exam     Annual exam, irregular menses, LMP 2023, is sexually active, Last pap 10/27/2021 normal. Pt also here to discuss ultrasound d/t irregular menses. The patient is a 21 y.o. female, Araceli Gomez who presents for her annual exam.  She  menses irregular . She is  sexually active. She is not currently taking birth control    She reports additional symptoms of vaginal discharge/irritation, lost prescribed Flagyl    Pap smear history: Her last PAP smear was in . Her results were normal.    Past Medical History:   Diagnosis Date    Abnormal uterine bleeding (AUB)     ADHD (attention deficit hyperactivity disorder)     Anxiety     Bipolar 1 disorder (HCC)     Depression     Exposure to STD     Gonorrhea     Herpes simplex virus (HSV) infection     History of irregular menstrual bleeding     Miscarriage     Obesity     Pregnant     Vaginal itching     Vitamin D deficiency     Vulvar rash        No past surgical history on file. Family History   Problem Relation Age of Onset    Asthma Mother        Social History     Tobacco Use    Smoking status: Every Day     Packs/day: .5     Types: Cigarettes     Start date: 2014    Smokeless tobacco: Never   Vaping Use    Vaping Use: Never used   Substance Use Topics    Alcohol use: Not Currently     Comment: rarely    Drug use: Not Currently     Types: Marijuana [place])       Current Outpatient Medications   Medication Sig Dispense Refill    metroNIDAZOLE (FLAGYL) 500 MG tablet Take 1 tablet by mouth in the morning and 1 tablet in the evening. 14 tablet 0     No current facility-administered medications for this visit. No Known Allergies        Immunization History   Administered Date(s) Administered    TDaP, ADACEL (age 6y-58y), BOOSTRIX (age 10y+), IM, 0.5mL 2018       Review of Systems   Constitutional: Negative. Negative for fatigue.

## 2023-09-17 LAB — SHBG SERPL-SCNC: 23 NMOL/L (ref 25–122)

## 2023-09-18 LAB
INSULIN COMMENT: NORMAL
INSULIN REFERENCE RANGE:: NORMAL
INSULIN SERPL-ACNC: 16.2 MU/L
SHBG SERPL-SCNC: 21 NMOL/L (ref 30–135)
TESTOST FREE SERPL-MCNC: 13.3 PG/ML (ref 0.8–7.4)
TESTOST SERPL-MCNC: 58 NG/DL (ref 20–70)

## 2023-09-19 LAB — DHEA-S SERPL-MCNC: 238 UG/DL (ref 65–380)

## 2024-02-29 ENCOUNTER — OFFICE VISIT (OUTPATIENT)
Dept: OBGYN | Age: 24
End: 2024-02-29
Payer: COMMERCIAL

## 2024-02-29 VITALS
DIASTOLIC BLOOD PRESSURE: 90 MMHG | BODY MASS INDEX: 40.97 KG/M2 | SYSTOLIC BLOOD PRESSURE: 134 MMHG | WEIGHT: 240 LBS | HEIGHT: 64 IN

## 2024-02-29 DIAGNOSIS — N92.6 IRREGULAR MENSES: Primary | ICD-10-CM

## 2024-02-29 LAB
CONTROL: NORMAL
PREGNANCY TEST URINE, POC: NEGATIVE

## 2024-02-29 PROCEDURE — G8484 FLU IMMUNIZE NO ADMIN: HCPCS | Performed by: OBSTETRICS & GYNECOLOGY

## 2024-02-29 PROCEDURE — G8417 CALC BMI ABV UP PARAM F/U: HCPCS | Performed by: OBSTETRICS & GYNECOLOGY

## 2024-02-29 PROCEDURE — G8427 DOCREV CUR MEDS BY ELIG CLIN: HCPCS | Performed by: OBSTETRICS & GYNECOLOGY

## 2024-02-29 PROCEDURE — 99214 OFFICE O/P EST MOD 30 MIN: CPT | Performed by: OBSTETRICS & GYNECOLOGY

## 2024-02-29 PROCEDURE — 81025 URINE PREGNANCY TEST: CPT | Performed by: OBSTETRICS & GYNECOLOGY

## 2024-02-29 PROCEDURE — 4004F PT TOBACCO SCREEN RCVD TLK: CPT | Performed by: OBSTETRICS & GYNECOLOGY

## 2024-02-29 RX ORDER — CARBAMAZEPINE 200 MG/1
200 TABLET ORAL 2 TIMES DAILY
COMMUNITY
Start: 2024-01-30

## 2024-02-29 RX ORDER — ZIPRASIDONE HYDROCHLORIDE 20 MG/1
20 CAPSULE ORAL NIGHTLY
COMMUNITY
Start: 2024-01-30

## 2024-02-29 RX ORDER — CLONIDINE HYDROCHLORIDE 0.1 MG/1
TABLET ORAL
COMMUNITY
Start: 2024-01-30

## 2024-02-29 RX ORDER — NORGESTIMATE AND ETHINYL ESTRADIOL 0.25-0.035
1 KIT ORAL DAILY
Qty: 28 TABLET | Refills: 11 | Status: SHIPPED | OUTPATIENT
Start: 2024-02-29

## 2024-02-29 RX ORDER — MEDROXYPROGESTERONE ACETATE 10 MG/1
10 TABLET ORAL DAILY
Qty: 10 TABLET | Refills: 0 | Status: SHIPPED | OUTPATIENT
Start: 2024-02-29

## 2024-02-29 ASSESSMENT — PATIENT HEALTH QUESTIONNAIRE - PHQ9
1. LITTLE INTEREST OR PLEASURE IN DOING THINGS: 0
SUM OF ALL RESPONSES TO PHQ QUESTIONS 1-9: 0
2. FEELING DOWN, DEPRESSED OR HOPELESS: 0
SUM OF ALL RESPONSES TO PHQ9 QUESTIONS 1 & 2: 0
SUM OF ALL RESPONSES TO PHQ QUESTIONS 1-9: 0

## 2024-03-05 NOTE — PROGRESS NOTES
3/5/24    Melva Alexander  2000    Chief Complaint   Patient presents with    Menstrual Problem     Patient presents today complaining of irregular menses. Has been bleeding x4 wks. First 7 days normal moderate flow menses, 2 weeks of spotting then 5 days of heavy dark red bleeding with clots, changing pad every 3hrs. Denies any pain, sexually active no b.c., h/o pcos         Melva Alexander is a 24 y.o. female who presents today for evaluation of see above.    Past Medical History:   Diagnosis Date    Abnormal uterine bleeding (AUB)     ADHD (attention deficit hyperactivity disorder)     Anxiety     Bipolar 1 disorder (HCC)     Depression     Exposure to STD     Gonorrhea     Herpes simplex virus (HSV) infection     History of irregular menstrual bleeding     Miscarriage     Obesity     Pregnant     Vaginal itching     Vitamin D deficiency     Vulvar rash        No past surgical history on file.    Social History     Tobacco Use    Smoking status: Every Day     Current packs/day: 0.50     Average packs/day: 0.5 packs/day for 10.0 years (5.0 ttl pk-yrs)     Types: Cigarettes     Start date: 2/17/2014    Smokeless tobacco: Never   Vaping Use    Vaping Use: Never used   Substance Use Topics    Alcohol use: Not Currently     Comment: rarely    Drug use: Not Currently     Types: Marijuana (Weed)       Family History   Problem Relation Age of Onset    Asthma Mother        Current Outpatient Medications   Medication Sig Dispense Refill    carBAMazepine (TEGRETOL) 200 MG tablet Take 1 tablet by mouth 2 times daily      cloNIDine (CATAPRES) 0.1 MG tablet TAKE 1 TABLET BY MOUTH ONCE A DAY AS NEEDED FOR ANXIETY/ANGER      ziprasidone (GEODON) 20 MG capsule Take 1 capsule by mouth at bedtime      medroxyPROGESTERone (PROVERA) 10 MG tablet Take 1 tablet by mouth daily 10 days to stop bleeding or start normal cycle 10 tablet 0    norgestimate-ethinyl estradiol (SPRINTEC 28) 0.25-35 MG-MCG per tablet Take 1 tablet

## 2024-03-18 ENCOUNTER — TELEPHONE (OUTPATIENT)
Dept: OBGYN | Age: 24
End: 2024-03-18

## 2024-03-18 NOTE — TELEPHONE ENCOUNTER
Pt states she took the provera to stop her abnormal bleeding and it worked, so she started the oc x 1 week.  Pt states that she is currently bleeding very heavy with cramps. Please advise.

## 2024-12-30 ENCOUNTER — OFFICE VISIT (OUTPATIENT)
Dept: OBGYN | Age: 24
End: 2024-12-30
Payer: COMMERCIAL

## 2024-12-30 ENCOUNTER — HOSPITAL ENCOUNTER (OUTPATIENT)
Age: 24
Setting detail: SPECIMEN
Discharge: HOME | End: 2024-12-30
Payer: COMMERCIAL

## 2024-12-30 VITALS
WEIGHT: 231 LBS | HEIGHT: 64 IN | BODY MASS INDEX: 39.44 KG/M2 | DIASTOLIC BLOOD PRESSURE: 86 MMHG | SYSTOLIC BLOOD PRESSURE: 117 MMHG

## 2024-12-30 DIAGNOSIS — F31.9 BIPOLAR 1 DISORDER (HCC): ICD-10-CM

## 2024-12-30 DIAGNOSIS — Z01.419 ENCOUNTER FOR ANNUAL ROUTINE GYNECOLOGICAL EXAMINATION: Primary | ICD-10-CM

## 2024-12-30 DIAGNOSIS — Z11.3 ENCOUNTER FOR SCREENING EXAMINATION FOR SEXUALLY TRANSMITTED INFECTION: ICD-10-CM

## 2024-12-30 DIAGNOSIS — N89.8 VAGINAL ITCHING: ICD-10-CM

## 2024-12-30 DIAGNOSIS — N92.6 IRREGULAR MENSES: ICD-10-CM

## 2024-12-30 DIAGNOSIS — N89.8 VAGINAL DISCHARGE: ICD-10-CM

## 2024-12-30 LAB
CONTROL: NORMAL
PREGNANCY TEST URINE, POC: NEGATIVE

## 2024-12-30 PROCEDURE — G0123 SCREEN CERV/VAG THIN LAYER: HCPCS

## 2024-12-30 PROCEDURE — 87591 N.GONORRHOEAE DNA AMP PROB: CPT

## 2024-12-30 PROCEDURE — G8484 FLU IMMUNIZE NO ADMIN: HCPCS

## 2024-12-30 PROCEDURE — 36415 COLL VENOUS BLD VENIPUNCTURE: CPT

## 2024-12-30 PROCEDURE — 99395 PREV VISIT EST AGE 18-39: CPT

## 2024-12-30 PROCEDURE — 99459 PELVIC EXAMINATION: CPT

## 2024-12-30 PROCEDURE — 99213 OFFICE O/P EST LOW 20 MIN: CPT

## 2024-12-30 PROCEDURE — 81025 URINE PREGNANCY TEST: CPT

## 2024-12-30 PROCEDURE — 87491 CHLMYD TRACH DNA AMP PROBE: CPT

## 2024-12-30 RX ORDER — NORGESTIMATE AND ETHINYL ESTRADIOL 0.25-0.035
1 KIT ORAL DAILY
Qty: 3 PACKET | Refills: 3 | Status: SHIPPED | OUTPATIENT
Start: 2024-12-30

## 2024-12-30 SDOH — ECONOMIC STABILITY: FOOD INSECURITY: WITHIN THE PAST 12 MONTHS, THE FOOD YOU BOUGHT JUST DIDN'T LAST AND YOU DIDN'T HAVE MONEY TO GET MORE.: NEVER TRUE

## 2024-12-30 SDOH — ECONOMIC STABILITY: FOOD INSECURITY: WITHIN THE PAST 12 MONTHS, YOU WORRIED THAT YOUR FOOD WOULD RUN OUT BEFORE YOU GOT MONEY TO BUY MORE.: NEVER TRUE

## 2024-12-30 SDOH — ECONOMIC STABILITY: INCOME INSECURITY: HOW HARD IS IT FOR YOU TO PAY FOR THE VERY BASICS LIKE FOOD, HOUSING, MEDICAL CARE, AND HEATING?: NOT HARD AT ALL

## 2024-12-30 ASSESSMENT — ENCOUNTER SYMPTOMS
CONSTIPATION: 0
NAUSEA: 0
GASTROINTESTINAL NEGATIVE: 1
RESPIRATORY NEGATIVE: 1
DIARRHEA: 0
SHORTNESS OF BREATH: 0
VOMITING: 0
ABDOMINAL PAIN: 0
CHEST TIGHTNESS: 0

## 2024-12-30 NOTE — PROGRESS NOTES
24    Melva Alexander  2000    Chief Complaint   Patient presents with    Gynecologic Exam     Annual exam. Smoker  No known h/o dvt.  Patient's last menstrual period was 11/15/2024 (approximate)..Periods are irregular. Pt requesting preg test, Preg test in office negative.   Patient is sexually active. Patient is not on birth control, states ran out of ocp last month.  Pap Smear was   and results were negative HPV negative. Patient complains of vaginal irritation with white/cloudy discharge with itching x 2 weeks. Pt denied dysuria.         The patient is a 24 y.o. female,  who presents for her annual exam.  She is menstruating abnormally.  She is sexually active. She is not currently taking birth control, ran out of OCP, needs refills.     She reports additional symptoms of vaginal discharge/irritation over the past month. Also some dyspareunia since this started. Reports new sexual partner, desires full STI check. Denies dysuria, fevers/chills.     Pap smear history: Her last PAP smear was in 10/2021.  Her results were normal.    Past Medical History:   Diagnosis Date    Abnormal uterine bleeding (AUB)     ADHD (attention deficit hyperactivity disorder)     Anxiety     Bipolar 1 disorder (HCC)     Depression     Exposure to STD     Gonorrhea     Herpes simplex virus (HSV) infection     History of irregular menstrual bleeding     Miscarriage     Obesity     Pregnant     Vaginal itching     Vitamin D deficiency     Vulvar rash        No past surgical history on file.    Family History   Problem Relation Age of Onset    Asthma Mother        Social History     Tobacco Use    Smoking status: Every Day     Current packs/day: 0.50     Average packs/day: 0.5 packs/day for 10.9 years (5.4 ttl pk-yrs)     Types: Cigarettes     Start date: 2014    Smokeless tobacco: Never   Vaping Use    Vaping status: Never Used   Substance Use Topics    Alcohol use: Not Currently     Comment: rarely    Drug

## 2024-12-31 LAB
BV BACTERIA DNA VAG QL NAA+PROBE: DETECTED
C GLABRATA DNA VAG QL NAA+PROBE: ABNORMAL
C GLABRATA DNA VAG QL NAA+PROBE: NOT DETECTED
C KRUSEI DNA VAG QL NAA+PROBE: NOT DETECTED
CANDIDA DNA VAG QL NAA+PROBE: NOT DETECTED
HBV SURFACE AG SERPL QL IA: NORMAL
HERPES SIMPLEX VIRUS 1 IGG: NEGATIVE
HERPES SIMPLEX VIRUS 2 IGG: POSITIVE
HIV 1+2 AB+HIV1 P24 AG SERPL QL IA: NORMAL
HIV 2 AB SERPL QL IA: NORMAL
HIV1 AB SERPL QL IA: NORMAL
HIV1 P24 AG SERPL QL IA: NORMAL
REAGIN+T PALLIDUM IGG+IGM SERPL-IMP: NORMAL
T VAGINALIS DNA VAG QL NAA+PROBE: DETECTED

## 2025-01-01 RX ORDER — METRONIDAZOLE 500 MG/1
500 TABLET ORAL 2 TIMES DAILY
Qty: 14 TABLET | Refills: 0 | Status: SHIPPED | OUTPATIENT
Start: 2025-01-01 | End: 2025-01-08

## 2025-01-02 LAB
C TRACH SPEC QL CULT: NEGATIVE
GYNECOLOGY CYTOLOGY REPORT: NORMAL
NEISSERIA GONORRHOEAE, CULTURE: NEGATIVE

## 2025-01-24 ENCOUNTER — HOSPITAL ENCOUNTER (EMERGENCY)
Age: 25
Discharge: PSYCHIATRIC OR SUBSTANCE USE FACILITY | End: 2025-01-27
Attending: EMERGENCY MEDICINE
Payer: COMMERCIAL

## 2025-01-24 DIAGNOSIS — R45.851 DEPRESSION WITH SUICIDAL IDEATION: Primary | ICD-10-CM

## 2025-01-24 DIAGNOSIS — F32.A DEPRESSION WITH SUICIDAL IDEATION: Primary | ICD-10-CM

## 2025-01-24 LAB
ALBUMIN SERPL-MCNC: 3.9 G/DL (ref 3.4–5)
ALBUMIN/GLOB SERPL: 1.4 {RATIO} (ref 1.1–2.2)
ALP SERPL-CCNC: 38 U/L (ref 40–129)
ALT SERPL-CCNC: 7 U/L (ref 10–40)
AMPHET UR QL SCN: NEGATIVE
ANION GAP SERPL CALCULATED.3IONS-SCNC: 10 MMOL/L (ref 9–17)
APAP SERPL-MCNC: <5 UG/ML (ref 10–30)
AST SERPL-CCNC: 13 U/L (ref 15–37)
BARBITURATES UR QL SCN: NEGATIVE
BASOPHILS # BLD: 0.05 K/UL
BASOPHILS NFR BLD: 1 % (ref 0–1)
BENZODIAZ UR QL: NEGATIVE
BILIRUB SERPL-MCNC: 0.2 MG/DL (ref 0–1)
BUN SERPL-MCNC: 10 MG/DL (ref 7–20)
CALCIUM SERPL-MCNC: 9.4 MG/DL (ref 8.3–10.6)
CANNABINOIDS UR QL SCN: POSITIVE
CHLORIDE SERPL-SCNC: 107 MMOL/L (ref 99–110)
CO2 SERPL-SCNC: 22 MMOL/L (ref 21–32)
COCAINE UR QL SCN: POSITIVE
CREAT SERPL-MCNC: 0.7 MG/DL (ref 0.6–1.1)
EOSINOPHIL # BLD: 0.12 K/UL
EOSINOPHILS RELATIVE PERCENT: 2 % (ref 0–3)
ERYTHROCYTE [DISTWIDTH] IN BLOOD BY AUTOMATED COUNT: 15.9 % (ref 11.7–14.9)
ETHANOLAMINE SERPL-MCNC: <10 MG/DL (ref 0–0.08)
FENTANYL UR QL: NEGATIVE
GFR, ESTIMATED: >90 ML/MIN/1.73M2
GLUCOSE SERPL-MCNC: 87 MG/DL (ref 74–99)
HCT VFR BLD AUTO: 38.4 % (ref 37–47)
HGB BLD-MCNC: 12.2 G/DL (ref 12.5–16)
IMM GRANULOCYTES # BLD AUTO: 0.02 K/UL
IMM GRANULOCYTES NFR BLD: 0 %
LYMPHOCYTES NFR BLD: 2.13 K/UL
LYMPHOCYTES RELATIVE PERCENT: 34 % (ref 24–44)
MCH RBC QN AUTO: 26.5 PG (ref 27–31)
MCHC RBC AUTO-ENTMCNC: 31.8 G/DL (ref 32–36)
MCV RBC AUTO: 83.3 FL (ref 78–100)
MONOCYTES NFR BLD: 0.57 K/UL
MONOCYTES NFR BLD: 9 % (ref 0–4)
NEUTROPHILS NFR BLD: 55 % (ref 36–66)
NEUTS SEG NFR BLD: 3.47 K/UL
OPIATES UR QL SCN: NEGATIVE
OXYCODONE UR QL SCN: NEGATIVE
PLATELET # BLD AUTO: 289 K/UL (ref 140–440)
PMV BLD AUTO: 9.4 FL (ref 7.5–11.1)
POTASSIUM SERPL-SCNC: 3.9 MMOL/L (ref 3.5–5.1)
PROT SERPL-MCNC: 6.8 G/DL (ref 6.4–8.2)
RBC # BLD AUTO: 4.61 M/UL (ref 4.2–5.4)
SALICYLATES SERPL-MCNC: <0.5 MG/DL (ref 15–30)
SODIUM SERPL-SCNC: 139 MMOL/L (ref 136–145)
TEST INFORMATION: ABNORMAL
WBC OTHER # BLD: 6.4 K/UL (ref 4–10.5)

## 2025-01-24 PROCEDURE — 80179 DRUG ASSAY SALICYLATE: CPT

## 2025-01-24 PROCEDURE — 85025 COMPLETE CBC W/AUTO DIFF WBC: CPT

## 2025-01-24 PROCEDURE — 99285 EMERGENCY DEPT VISIT HI MDM: CPT

## 2025-01-24 PROCEDURE — 90792 PSYCH DIAG EVAL W/MED SRVCS: CPT | Performed by: REGISTERED NURSE

## 2025-01-24 PROCEDURE — 80143 DRUG ASSAY ACETAMINOPHEN: CPT

## 2025-01-24 PROCEDURE — 6360000002 HC RX W HCPCS

## 2025-01-24 PROCEDURE — 96372 THER/PROPH/DIAG INJ SC/IM: CPT

## 2025-01-24 PROCEDURE — 6370000000 HC RX 637 (ALT 250 FOR IP): Performed by: EMERGENCY MEDICINE

## 2025-01-24 PROCEDURE — 80053 COMPREHEN METABOLIC PANEL: CPT

## 2025-01-24 PROCEDURE — G0480 DRUG TEST DEF 1-7 CLASSES: HCPCS

## 2025-01-24 PROCEDURE — 80307 DRUG TEST PRSMV CHEM ANLYZR: CPT

## 2025-01-24 RX ORDER — IBUPROFEN 400 MG/1
800 TABLET, FILM COATED ORAL ONCE
Status: COMPLETED | OUTPATIENT
Start: 2025-01-24 | End: 2025-01-24

## 2025-01-24 RX ORDER — DIPHENHYDRAMINE HYDROCHLORIDE 50 MG/ML
25 INJECTION INTRAMUSCULAR; INTRAVENOUS ONCE
Status: DISCONTINUED | OUTPATIENT
Start: 2025-01-24 | End: 2025-01-24

## 2025-01-24 RX ORDER — LORAZEPAM 1 MG/1
1 TABLET ORAL ONCE
Status: COMPLETED | OUTPATIENT
Start: 2025-01-24 | End: 2025-01-24

## 2025-01-24 RX ORDER — DIPHENHYDRAMINE HYDROCHLORIDE 50 MG/ML
25 INJECTION INTRAMUSCULAR; INTRAVENOUS ONCE
Status: COMPLETED | OUTPATIENT
Start: 2025-01-24 | End: 2025-01-24

## 2025-01-24 RX ORDER — HALOPERIDOL 5 MG/ML
5 INJECTION INTRAMUSCULAR ONCE
Status: COMPLETED | OUTPATIENT
Start: 2025-01-24 | End: 2025-01-24

## 2025-01-24 RX ADMIN — LORAZEPAM 1 MG: 1 TABLET ORAL at 20:23

## 2025-01-24 RX ADMIN — DIPHENHYDRAMINE HYDROCHLORIDE 25 MG: 50 INJECTION, SOLUTION INTRAMUSCULAR; INTRAVENOUS at 21:20

## 2025-01-24 RX ADMIN — HALOPERIDOL LACTATE 5 MG: 5 INJECTION, SOLUTION INTRAMUSCULAR at 21:20

## 2025-01-24 RX ADMIN — IBUPROFEN 800 MG: 400 TABLET, FILM COATED ORAL at 19:23

## 2025-01-24 ASSESSMENT — PAIN DESCRIPTION - DESCRIPTORS
DESCRIPTORS: ACHING
DESCRIPTORS: ACHING

## 2025-01-24 ASSESSMENT — PAIN - FUNCTIONAL ASSESSMENT: PAIN_FUNCTIONAL_ASSESSMENT: 0-10

## 2025-01-24 ASSESSMENT — PAIN DESCRIPTION - LOCATION
LOCATION: HEAD
LOCATION: HEAD

## 2025-01-24 ASSESSMENT — PAIN SCALES - GENERAL
PAINLEVEL_OUTOF10: 7
PAINLEVEL_OUTOF10: 5

## 2025-01-24 ASSESSMENT — PAIN DESCRIPTION - FREQUENCY: FREQUENCY: CONTINUOUS

## 2025-01-25 VITALS
HEIGHT: 64 IN | TEMPERATURE: 97.9 F | OXYGEN SATURATION: 100 % | HEART RATE: 63 BPM | DIASTOLIC BLOOD PRESSURE: 71 MMHG | SYSTOLIC BLOOD PRESSURE: 111 MMHG | RESPIRATION RATE: 17 BRPM | WEIGHT: 230 LBS | BODY MASS INDEX: 39.27 KG/M2

## 2025-01-25 LAB
SARS-COV-2 RDRP RESP QL NAA+PROBE: NOT DETECTED
SPECIMEN DESCRIPTION: NORMAL

## 2025-01-25 PROCEDURE — 87635 SARS-COV-2 COVID-19 AMP PRB: CPT

## 2025-01-25 ASSESSMENT — PAIN SCALES - GENERAL: PAINLEVEL_OUTOF10: 4

## 2025-01-25 ASSESSMENT — PAIN DESCRIPTION - DESCRIPTORS: DESCRIPTORS: ACHING

## 2025-01-25 ASSESSMENT — PAIN DESCRIPTION - LOCATION: LOCATION: HEAD

## 2025-01-25 NOTE — ED NOTES
Transfer Center Handoff for Behavioral Health Transfers      Patient's Current Location: [facility] EMERGENCY DEPARTMENT     Chief Complaint   Patient presents with    Suicidal     Pt reports she has not been taking her medications lately. States her medications have been switched around and she doesn't like how it makes her feel so she quit taking them       Current or History of Violent Behavior: No    Currently in Restraints Now or During this Encounter: No  (Specify if Agitation or self harm is noted in ED?)  If yes, please describe behaviors requiring restraint:             Medical Clearance Documented and Verified in the Chart: Yes    No Known Allergies     Can Patient Tolerate Lying Flat: Yes    Able to Perform ADLs:  Yes  (Specify if able to ambulate or uses any mobility devices such as cane or walker)  Activity:    Level of Assistance:    Assistive Device:    Miscellaneous Devices:      LABS    CBC:   Lab Results   Component Value Date/Time    WBC 6.4 01/24/2025 06:50 PM    RBC 4.61 01/24/2025 06:50 PM    HGB 12.2 01/24/2025 06:50 PM    HCT 38.4 01/24/2025 06:50 PM    MCV 83.3 01/24/2025 06:50 PM    MCH 26.5 01/24/2025 06:50 PM    MCHC 31.8 01/24/2025 06:50 PM    RDW 15.9 01/24/2025 06:50 PM     01/24/2025 06:50 PM    MPV 9.4 01/24/2025 06:50 PM     CMP:   Lab Results   Component Value Date/Time     01/24/2025 06:50 PM    K 3.9 01/24/2025 06:50 PM     01/24/2025 06:50 PM    CO2 22 01/24/2025 06:50 PM    BUN 10 01/24/2025 06:50 PM    CREATININE 0.7 01/24/2025 06:50 PM    GFRAA >60 03/12/2022 07:04 PM    LABGLOM >90 01/24/2025 06:50 PM    GLUCOSE 87 01/24/2025 06:50 PM    CALCIUM 9.4 01/24/2025 06:50 PM    BILITOT 0.2 01/24/2025 06:50 PM    ALKPHOS 38 01/24/2025 06:50 PM    AST 13 01/24/2025 06:50 PM    ALT 7 01/24/2025 06:50 PM     Drug Panel:   Lab Results   Component Value Date/Time    OPIAU NEGATIVE 01/24/2025 07:32 PM    LABCOMM See Below 12/30/2024 02:06 PM

## 2025-01-25 NOTE — ED NOTES
Assisted pt to and from bathroom. Urine sample collected and sent to lab. Pt back to room 24, speaking with teledoc. Pt requested mother at bedside. Mother at bedside with pt.

## 2025-01-25 NOTE — ED NOTES
Pt told by her mother that her uncle was just killed in a car accident. Pt began screaming that she wanted her clothes and that she was going to leave. Attempts to deescalate pt were unsuccessful. Pt continued escalating and became belligerent with staff. Security to bedside. Pt requiring medications to help calm her down. Physical hold order placed by Dr. Way. Pt continued fighting and attempting to harm staff, so 4 point hard restraints were applied. Pt continuing to scream, cuss, and attempting to wiggle out of restraint. Pt asking for her mother to come back, but explained to pt why that was not a good idea at this time.

## 2025-01-25 NOTE — ED NOTES
Pt able to remain calm at this time. Pt apologetic for events that occurred. Restraints removed at this time. Pt provided water and warm blanket at this time.

## 2025-01-25 NOTE — ED NOTES
Mother of pt came to the nurse's station and stated, \"I'm about to give her some really bad news\". This tech made Mary ADRIAN aware.

## 2025-01-25 NOTE — VIRTUAL HEALTH
Melva aRmostington  [id number]  2000     EMERGENCY DEPARTMENT TELEPSYCHIATRY EVALUATION    01/24/25    Chief Complaint:  “medication issues”    Per chart review, \"Pt reports she has not been taking her medications lately. States her medications have been switched around and she doesn't like how it makes her feel so she quit taking them \"    HPI: Patient is a 25 y.o.  female who presents for medications issues. Patient presented to the ED on 01/24/25 from [facility]    Endorses suicidal thoughts, reports chronic thoughts significantly increased over the last little while.  States she thinks multiple ways she could attempts sucide.    States she had increased fatigue and brain fog, spoke with psychiatrist however did not make and changes so she discontinued medications in [facility].    No longer open with outpatient MH due to him    Lost grandmother and little brother last yea around October. States since this time mental health has continued to delcine.    Reports on waiting list to get in to mental health facility across the street.    Duration:  Severity: Rating mood to be around 0/10 (10- good)  Quality:melancholic  Worse all day  Content: Hopeless, worthless and helpless feeling  Suicidal thoughts active with multiple  Associated symptoms:  Poor concentration, anhedonia, decrease motivation  Sleep and appetite- varies; broken either too little or too much; poor appetite  Modifying factor: grief, non med complaince     Struggling with how to manage life, tearful.    [facility] admission increases anxiety    Racing thoughts, inability to concentration    Lost home 1 year ago, been living in hotel with mom    Joyce Toscano for collateral [phone]; mom states depression has been significantly increasing since the passing of her grandmother.  [facility] patient was previously engaged in services however they closed down, struggling since that time.  Reports she is scared for her daughters safety for last 2 month.

## 2025-01-25 NOTE — ED NOTES
Pt updated on transport and facility transfer. Pt verbalized understanding of transport to and from.

## 2025-01-25 NOTE — ED PROVIDER NOTES
Patient is endorsed to me by Dr. Johnson at 2000. In short, patient presented with suicidal ideation.  The patient was placed in suicide precautions, patient's clothing and belongings were removed, documented and stored in the emergency department.  Patient was reported to me to be medically stable. Mental health have evaluated the patient and have recommended that the patient be transferred to a inpatient psychiatric facility. We are currently awaiting placement for the patient.      ED Course as of 01/28/25 1042   Fri Jan 24, 2025 2111 Without notifying any staff member, patient's mother informed patient that someone she knew passed away. Pt is now upset and escalating in room. Screaming she wants her her fucking close right now and demanding to leave.  RN trying to de-escalate her and explaining she is pink slipped.  Security called to bedside. [CC]   Sun Jan 26, 2025   0942 Not seen by me [CB]      ED Course User Index  [CB] Opal Lanier DO  [CC] Augusto Way DO     I have signed out Melva Alexander's Emergency Department care to Dr. Monroy pending mh placement. We discussed the pertinent history, physical exam, completed/pending test results (if applicable) and current treatment plan. I Dr. Way am the primary clinician of record. Please refer to his/her chart for the patients remaining Emergency Department course and final disposition.       Augusto Way DO  01/28/25 1040

## 2025-01-25 NOTE — ED NOTES
Violent Restraint Face-to-Face Evaluation  (must be completed within one hour of initiation of restraints)      Evaluate immediate situation:  Pt was given bad news by her mother that her uncle had just passed away. Pt became escalated stating that she was going to leave, was becoming belligerent with staff, and was not able to be deescalated. Medications were ordered and a physical hold had to be applied. Due to pt continuing to fight and attempt to harm staff, 4 point violent restraints had to be applied.     Reaction to intervention: Pt continued to scream, began crying and thrashing in the bed.     Medical Condition/Assessment: Depression, anxiety, bipolar    Behavioral Condition/Assessment: Pt belligerent, upset, agitated and crying.     The patient’s review of systems, history, medications, and recent labs were reviewed at this time.     Continue/Discontinue restraints at this time: Discontinue    One-Hour Review Evaluation Physician Notification:    Provider Notified (Name):  Dr. Way     Date  1/24/2025     Time  2120      Date 1/24/2025 Time 2157    Physician or Designated One-Hour Reviewer  Cristal Rehman RN

## 2025-01-25 NOTE — ED PROVIDER NOTES
Triage Chief Complaint:   Suicidal (Pt reports she has not been taking her medications lately. States her medications have been switched around and she doesn't like how it makes her feel so she quit taking them)    [place]:  Melva Alexander is a 25 y.o. female that presents with worsening depression and suicidal ideation.  Patient was in baseline state of health until October of this year when her grandmother  and she stopped taking all of her psychiatric medications.  Patient reports that she did not like the way they made her feel as they made her very drowsy.  Patient reports that she has not had any improvement in her depression since being off her meds.  Patient also lost her car, lost her job and now lives in a hotel with her mother.  Mother has gotten concerned even go to work but she is afraid the patient is going to hurt herself.  Patient with increased SI today with no prompting event.  No specific plan although patient has harm to self in the past.  No homicidal ideation.  No hallucinations.  Patient does feel very anxious.    No somatic complaints at this time.    ROS:  General:  No fevers  Eyes:  No recent vison changes  ENT:  No sore throat, no nasal congestion  Cardiovascular:  No chest pain, no palpitations  Respiratory:  No shortness of breath  Gastrointestinal:  No pain, no nausea, no vomiting, no diarrhea  Musculoskeletal:  No muscle pain  Skin:  No rash  Neurologic:  no headache  Psychiatric: + anxiety, + depression, + SI  Genitourinary:  No dysuria  Endocrine:  No unexpected weight gain, no unexpected weight loss  Extremities:  no edema, no pain    Past Medical History:   Diagnosis Date    Abnormal uterine bleeding (AUB)     ADHD (attention deficit hyperactivity disorder)     Anxiety     Bipolar 1 disorder (HCC)     Depression     Exposure to STD     Gonorrhea     Herpes simplex virus (HSV) infection     History of irregular menstrual bleeding     Miscarriage     Obesity     Pregnant

## 2025-01-25 NOTE — ED NOTES
[facility]    Bed Number: 4th floor     Level of Care:     Report Number: [phone]     Accepting MD : Dr Abbi Edwards

## 2025-01-26 NOTE — ED PROVIDER NOTES
I did not participate in the care evaluation of this patient.    Tanya Monroy MD  1/25/2025  7:28 PM       Tanya Hebert MD  01/25/25 1928

## 2025-01-27 NOTE — VIRTUAL HEALTH
[facility] 24-Hour Reassessment     Reason for Cancel:  patient repopulated on the 24 hour list, Patient is off the the floor, and transported out. reassessment not needed at this time.      --ROBBIE Benton on 1/26/2025 at 7:29 PM    An electronic signature was used to authenticate this note.

## 2025-03-06 ENCOUNTER — TELEPHONE (OUTPATIENT)
Dept: OBGYN | Age: 25
End: 2025-03-06